# Patient Record
Sex: FEMALE | Race: WHITE | NOT HISPANIC OR LATINO | Employment: FULL TIME | URBAN - METROPOLITAN AREA
[De-identification: names, ages, dates, MRNs, and addresses within clinical notes are randomized per-mention and may not be internally consistent; named-entity substitution may affect disease eponyms.]

---

## 2017-08-30 ENCOUNTER — HOSPITAL ENCOUNTER (OUTPATIENT)
Facility: MEDICAL CENTER | Age: 32
End: 2017-08-31
Attending: EMERGENCY MEDICINE | Admitting: HOSPITALIST
Payer: COMMERCIAL

## 2017-08-30 ENCOUNTER — RESOLUTE PROFESSIONAL BILLING HOSPITAL PROF FEE (OUTPATIENT)
Dept: HOSPITALIST | Facility: MEDICAL CENTER | Age: 32
End: 2017-08-30
Payer: COMMERCIAL

## 2017-08-30 PROBLEM — R82.71 ASYMPTOMATIC BACTERIURIA: Status: ACTIVE | Noted: 2017-08-30

## 2017-08-30 PROBLEM — T78.3XXA ANGIOEDEMA: Status: ACTIVE | Noted: 2017-08-30

## 2017-08-30 PROBLEM — I73.00 RAYNAUD PHENOMENON: Status: ACTIVE | Noted: 2017-08-30

## 2017-08-30 LAB
ALBUMIN SERPL BCP-MCNC: 4.8 G/DL (ref 3.2–4.9)
ALBUMIN/GLOB SERPL: 1.6 G/DL
ALP SERPL-CCNC: 50 U/L (ref 30–99)
ALT SERPL-CCNC: 6 U/L (ref 2–50)
ANION GAP SERPL CALC-SCNC: 10 MMOL/L (ref 0–11.9)
APPEARANCE UR: CLEAR
AST SERPL-CCNC: 15 U/L (ref 12–45)
BACTERIA #/AREA URNS HPF: ABNORMAL /HPF
BASOPHILS # BLD AUTO: 0.4 % (ref 0–1.8)
BASOPHILS # BLD: 0.04 K/UL (ref 0–0.12)
BILIRUB SERPL-MCNC: 0.8 MG/DL (ref 0.1–1.5)
BILIRUB UR QL STRIP.AUTO: NEGATIVE
BUN SERPL-MCNC: 10 MG/DL (ref 8–22)
C4 SERPL-MCNC: 30 MG/DL (ref 19–52)
CALCIUM SERPL-MCNC: 10 MG/DL (ref 8.5–10.5)
CHLORIDE SERPL-SCNC: 107 MMOL/L (ref 96–112)
CO2 SERPL-SCNC: 21 MMOL/L (ref 20–33)
COLOR UR: YELLOW
CREAT SERPL-MCNC: 0.71 MG/DL (ref 0.5–1.4)
CRP SERPL HS-MCNC: 0.18 MG/DL (ref 0–0.75)
CULTURE IF INDICATED INDCX: YES UA CULTURE
EOSINOPHIL # BLD AUTO: 0.05 K/UL (ref 0–0.51)
EOSINOPHIL NFR BLD: 0.6 % (ref 0–6.9)
EPI CELLS #/AREA URNS HPF: ABNORMAL /HPF
ERYTHROCYTE [DISTWIDTH] IN BLOOD BY AUTOMATED COUNT: 41.8 FL (ref 35.9–50)
ERYTHROCYTE [SEDIMENTATION RATE] IN BLOOD BY WESTERGREN METHOD: 20 MM/HOUR (ref 0–20)
GFR SERPL CREATININE-BSD FRML MDRD: >60 ML/MIN/1.73 M 2
GLOBULIN SER CALC-MCNC: 3 G/DL (ref 1.9–3.5)
GLUCOSE SERPL-MCNC: 84 MG/DL (ref 65–99)
GLUCOSE UR STRIP.AUTO-MCNC: NEGATIVE MG/DL
HCG UR QL: NEGATIVE
HCT VFR BLD AUTO: 40.5 % (ref 37–47)
HGB BLD-MCNC: 13.3 G/DL (ref 12–16)
HYALINE CASTS #/AREA URNS LPF: ABNORMAL /LPF
IMM GRANULOCYTES # BLD AUTO: 0.03 K/UL (ref 0–0.11)
IMM GRANULOCYTES NFR BLD AUTO: 0.3 % (ref 0–0.9)
KETONES UR STRIP.AUTO-MCNC: 80 MG/DL
LEUKOCYTE ESTERASE UR QL STRIP.AUTO: ABNORMAL
LYMPHOCYTES # BLD AUTO: 1.98 K/UL (ref 1–4.8)
LYMPHOCYTES NFR BLD: 21.9 % (ref 22–41)
MCH RBC QN AUTO: 30 PG (ref 27–33)
MCHC RBC AUTO-ENTMCNC: 32.8 G/DL (ref 33.6–35)
MCV RBC AUTO: 91.2 FL (ref 81.4–97.8)
MICRO URNS: ABNORMAL
MONOCYTES # BLD AUTO: 0.41 K/UL (ref 0–0.85)
MONOCYTES NFR BLD AUTO: 4.5 % (ref 0–13.4)
NEUTROPHILS # BLD AUTO: 6.55 K/UL (ref 2–7.15)
NEUTROPHILS NFR BLD: 72.3 % (ref 44–72)
NITRITE UR QL STRIP.AUTO: POSITIVE
NRBC # BLD AUTO: 0 K/UL
NRBC BLD AUTO-RTO: 0 /100 WBC
PH UR STRIP.AUTO: 5.5 [PH]
PLATELET # BLD AUTO: 265 K/UL (ref 164–446)
PMV BLD AUTO: 11.4 FL (ref 9–12.9)
POTASSIUM SERPL-SCNC: 3.8 MMOL/L (ref 3.6–5.5)
PROT SERPL-MCNC: 7.8 G/DL (ref 6–8.2)
PROT UR QL STRIP: NEGATIVE MG/DL
RBC # BLD AUTO: 4.44 M/UL (ref 4.2–5.4)
RBC # URNS HPF: ABNORMAL /HPF
RBC UR QL AUTO: NEGATIVE
SODIUM SERPL-SCNC: 138 MMOL/L (ref 135–145)
SP GR UR REFRACTOMETRY: 1.01
SP GR UR STRIP.AUTO: 1.01
UROBILINOGEN UR STRIP.AUTO-MCNC: 0.2 MG/DL
WBC # BLD AUTO: 9.1 K/UL (ref 4.8–10.8)
WBC #/AREA URNS HPF: ABNORMAL /HPF

## 2017-08-30 PROCEDURE — 87077 CULTURE AEROBIC IDENTIFY: CPT

## 2017-08-30 PROCEDURE — 86140 C-REACTIVE PROTEIN: CPT

## 2017-08-30 PROCEDURE — 99220 PR INITIAL OBSERVATION CARE,LEVL III: CPT | Performed by: HOSPITALIST

## 2017-08-30 PROCEDURE — 81025 URINE PREGNANCY TEST: CPT

## 2017-08-30 PROCEDURE — 36415 COLL VENOUS BLD VENIPUNCTURE: CPT

## 2017-08-30 PROCEDURE — 700105 HCHG RX REV CODE 258: Performed by: EMERGENCY MEDICINE

## 2017-08-30 PROCEDURE — 94760 N-INVAS EAR/PLS OXIMETRY 1: CPT

## 2017-08-30 PROCEDURE — 80053 COMPREHEN METABOLIC PANEL: CPT

## 2017-08-30 PROCEDURE — 96374 THER/PROPH/DIAG INJ IV PUSH: CPT

## 2017-08-30 PROCEDURE — 85652 RBC SED RATE AUTOMATED: CPT

## 2017-08-30 PROCEDURE — 86160 COMPLEMENT ANTIGEN: CPT | Mod: 91

## 2017-08-30 PROCEDURE — 96375 TX/PRO/DX INJ NEW DRUG ADDON: CPT

## 2017-08-30 PROCEDURE — 81001 URINALYSIS AUTO W/SCOPE: CPT

## 2017-08-30 PROCEDURE — 85025 COMPLETE CBC W/AUTO DIFF WBC: CPT

## 2017-08-30 PROCEDURE — 700111 HCHG RX REV CODE 636 W/ 250 OVERRIDE (IP): Performed by: EMERGENCY MEDICINE

## 2017-08-30 PROCEDURE — 86160 COMPLEMENT ANTIGEN: CPT

## 2017-08-30 PROCEDURE — 96361 HYDRATE IV INFUSION ADD-ON: CPT

## 2017-08-30 PROCEDURE — 87086 URINE CULTURE/COLONY COUNT: CPT

## 2017-08-30 PROCEDURE — 87186 SC STD MICRODIL/AGAR DIL: CPT

## 2017-08-30 PROCEDURE — 99285 EMERGENCY DEPT VISIT HI MDM: CPT

## 2017-08-30 PROCEDURE — G0378 HOSPITAL OBSERVATION PER HR: HCPCS

## 2017-08-30 RX ORDER — ERGOCALCIFEROL 1.25 MG/1
50000 CAPSULE ORAL
Status: SHIPPED | COMMUNITY
End: 2021-11-02

## 2017-08-30 RX ORDER — POLYETHYLENE GLYCOL 3350 17 G/17G
1 POWDER, FOR SOLUTION ORAL
Status: DISCONTINUED | OUTPATIENT
Start: 2017-08-30 | End: 2017-08-31 | Stop reason: HOSPADM

## 2017-08-30 RX ORDER — METHYLPREDNISOLONE SODIUM SUCCINATE 125 MG/2ML
125 INJECTION, POWDER, LYOPHILIZED, FOR SOLUTION INTRAMUSCULAR; INTRAVENOUS ONCE
Status: COMPLETED | OUTPATIENT
Start: 2017-08-30 | End: 2017-08-30

## 2017-08-30 RX ORDER — AMOXICILLIN 250 MG
2 CAPSULE ORAL 2 TIMES DAILY
Status: DISCONTINUED | OUTPATIENT
Start: 2017-08-30 | End: 2017-08-31 | Stop reason: HOSPADM

## 2017-08-30 RX ORDER — DIPHENHYDRAMINE HYDROCHLORIDE 50 MG/ML
25 INJECTION INTRAMUSCULAR; INTRAVENOUS EVERY 6 HOURS PRN
Status: DISCONTINUED | OUTPATIENT
Start: 2017-08-30 | End: 2017-08-31

## 2017-08-30 RX ORDER — SODIUM CHLORIDE 9 MG/ML
1000 INJECTION, SOLUTION INTRAVENOUS ONCE
Status: COMPLETED | OUTPATIENT
Start: 2017-08-30 | End: 2017-08-30

## 2017-08-30 RX ORDER — DIPHENHYDRAMINE HYDROCHLORIDE 50 MG/ML
25 INJECTION INTRAMUSCULAR; INTRAVENOUS ONCE
Status: COMPLETED | OUTPATIENT
Start: 2017-08-30 | End: 2017-08-30

## 2017-08-30 RX ORDER — BISACODYL 10 MG
10 SUPPOSITORY, RECTAL RECTAL
Status: DISCONTINUED | OUTPATIENT
Start: 2017-08-30 | End: 2017-08-31 | Stop reason: HOSPADM

## 2017-08-30 RX ADMIN — DIPHENHYDRAMINE HYDROCHLORIDE 25 MG: 50 INJECTION, SOLUTION INTRAMUSCULAR; INTRAVENOUS at 17:34

## 2017-08-30 RX ADMIN — SODIUM CHLORIDE 1000 ML: 9 INJECTION, SOLUTION INTRAVENOUS at 20:20

## 2017-08-30 RX ADMIN — METHYLPREDNISOLONE SODIUM SUCCINATE 125 MG: 125 INJECTION, POWDER, FOR SOLUTION INTRAMUSCULAR; INTRAVENOUS at 17:34

## 2017-08-30 ASSESSMENT — LIFESTYLE VARIABLES
EVER_SMOKED: NEVER
ALCOHOL_USE: NO

## 2017-08-30 ASSESSMENT — ENCOUNTER SYMPTOMS
BLURRED VISION: 0
SENSORY CHANGE: 0
CHILLS: 0
NAUSEA: 0
WEAKNESS: 0
BRUISES/BLEEDS EASILY: 0
TINGLING: 0
HEADACHES: 0
VOMITING: 0
PALPITATIONS: 0
ABDOMINAL PAIN: 0
FEVER: 0
SHORTNESS OF BREATH: 0

## 2017-08-30 ASSESSMENT — PAIN SCALES - GENERAL: PAINLEVEL_OUTOF10: 4

## 2017-08-30 ASSESSMENT — PATIENT HEALTH QUESTIONNAIRE - PHQ9
SUM OF ALL RESPONSES TO PHQ QUESTIONS 1-9: 0
SUM OF ALL RESPONSES TO PHQ9 QUESTIONS 1 AND 2: 0
1. LITTLE INTEREST OR PLEASURE IN DOING THINGS: NOT AT ALL
2. FEELING DOWN, DEPRESSED, IRRITABLE, OR HOPELESS: NOT AT ALL

## 2017-08-30 NOTE — ED NOTES
".  Chief Complaint   Patient presents with   • Tongue Swelling     x 6 days, \"gets better\" throughout the day and then \"in the mornings and evenings swells up again\", denies difficulty breathing, NAD, taking benadryl and allergra with      ./60   Pulse (!) 103   Temp 36.9 °C (98.4 °F) (Temporal)   Resp 16   Ht 1.727 m (5' 8\")   Wt 71.1 kg (156 lb 12 oz)   SpO2 97%   BMI 23.83 kg/m²     Ambulatory to triage with above complaint, educated on triage process, placed in lobby, told to inform staff of any changes in condtion.    "

## 2017-08-30 NOTE — ED NOTES
Blood samples collect in lobby. No new swelling. Managing secretions. No Wheezing or respiratory distress at this time.

## 2017-08-30 NOTE — ED PROVIDER NOTES
"ED Provider Note    CHIEF COMPLAINT  Chief Complaint   Patient presents with   • Tongue Swelling     x 6 days, \"gets better\" throughout the day and then \"in the mornings and evenings swells up again\", denies difficulty breathing, NAD, taking benadryl and allergra with        HPI  Karina Stern is a 32 y.o. female who presents with complaints of swelling to her tongue for the past 6 days. The patient says that the swelling seems worse in the morning, then gets better throughout the day, then in the evening seems to get worse again. The swelling appears be worse on the left side of the tongue. She has had no swelling to the throat, face, or lips, or any other parts of her body. She has had no rash. She denies any difficulty breathing or swallowing. She has not been ill with any recent fever, chills, sore throat, cough, congestion, vomiting, or diarrhea. She has been taking Benadryl and Allegra since the symptoms started. The patient does not take any medications on a daily basis. She has not eaten any new foods, nuts, or fruits. She otherwise has no known allergies. There is no history in the family of angioedema.    REVIEW OF SYSTEMS  See HPI for further details. All other systems are negative.     PAST MEDICAL HISTORY  No past medical history on file.    FAMILY HISTORY  No family history on file.    SOCIAL HISTORY  Social History     Social History   • Marital status: Single     Spouse name: N/A   • Number of children: N/A   • Years of education: N/A     Social History Main Topics   • Smoking status: Never Smoker   • Smokeless tobacco: Never Used   • Alcohol use No   • Drug use: No   • Sexual activity: Not on file     Other Topics Concern   • Not on file     Social History Narrative   • No narrative on file       SURGICAL HISTORY  Past Surgical History:   Procedure Laterality Date   • CHOLECYSTECTOMY     • ERCP     • PRIMARY C SECTION         CURRENT MEDICATIONS  Home Medications     Reviewed by Candi KATHLEEN" "EUGENIO Duarte (Registered Nurse) on 08/30/17 at 1502  Med List Status: Partial   Medication Last Dose Status   benzonatate (TESSALON) 100 MG Cap  Active   fluticasone (FLONASE) 50 MCG/ACT nasal spray  Active   hydrocod polst-chlorphen polst (TUSSIONEX) 10-8 MG/5ML Liquid CR  Active   maalox plus-benadryl-xylocaine (MBX)  Active   Tetrahydrozoline-Zn Sulfate (EYE DROPS ALLERGY RELIEF OP) Not Taking Active                ALLERGIES  Allergies   Allergen Reactions   • Dilaudid [Hydromorphone Hcl]        PHYSICAL EXAM  VITAL SIGNS: /60   Pulse (!) 103   Temp 36.9 °C (98.4 °F) (Temporal)   Resp 16   Ht 1.727 m (5' 8\")   Wt 71.1 kg (156 lb 12 oz)   SpO2 97%   BMI 23.83 kg/m²   Constitutional: Awake, alert, in no acute distress, Non-toxic appearance.   HENT: Atraumatic. Bilateral external ears normal, mucous membranes moist, throat nonerythematous without exudates, nose is normal.There is no swelling to the lips, soft palate or uvula. The left side of the tongue is moderately swollen, the right side slightly swollen area there are 2 bite marks noted to the left underside of the tongue with slight amount of dark blood staining the tongue.  Eyes: PERRL, EOMI, conjunctiva moist, noninjected.  Neck: Nontender, Normal range of motion, No nuchal rigidity, No stridor.   Lymphatic: No lymphadenopathy noted.   Cardiovascular: Regular rate and rhythm, no murmurs, rubs, gallops.  Thorax & Lungs:  Good breath sounds bilaterally, no wheezes, rales, or retractions.  No chest tenderness.  Abdomen: Bowel sounds normal, Soft, nontender, nondistended, no rebound, guarding, masses.  Back: No CVA or spinal tenderness.  Extremities: Intact distal pulses, No edema, No tenderness.   Skin: Warm, Dry, No rashes.   Musculoskeletal: No joint swelling or tenderness.  Neurologic: Alert & oriented x 3, sensory and motor function normal. No focal deficits.   Psychiatric: Affect normal, Judgment normal, Mood normal. "       RADIOLOGY/PROCEDURES  No orders to display         COURSE & MEDICAL DECISION MAKING  Pertinent Labs & Imaging studies reviewed. (See chart for details)  The patient presents with what appears to be etiopathic angioedema. She has had swelling to the tongue, but no other associated symptoms such as facial swelling or urticaria. Labwork was obtained and shows a normal CBC, normal chemistry panel, hCG negative, C-reactive protein 0.18, sedimentation rate 20, C4 complement 30, urine 80 ketones, positive nitrites, small leukocyte esterase, many bacteria.  Patient was given a dose of Solu-Medrol 125 mg IV, and Benadryl 25 mg IV. She will be given a bolus of normal saline as she does show ketones in her urine. Case was discussed with Dr. Camacho for admission.    FINAL IMPRESSION  1. Angioedema  2.   3.         Electronically signed by: Grey Eddy, 8/30/2017 3:27 PM

## 2017-08-30 NOTE — ED NOTES
"Chief Complaint   Patient presents with   • Tongue Swelling     x 6 days, \"gets better\" throughout the day and then \"in the mornings and evenings swells up again\", denies difficulty breathing, NAD, taking benadryl and allergra with      Agree with triage RN. Chart up for ERP.   "

## 2017-08-31 VITALS
RESPIRATION RATE: 16 BRPM | HEART RATE: 81 BPM | HEIGHT: 67 IN | BODY MASS INDEX: 23.91 KG/M2 | DIASTOLIC BLOOD PRESSURE: 57 MMHG | OXYGEN SATURATION: 97 % | SYSTOLIC BLOOD PRESSURE: 107 MMHG | TEMPERATURE: 98.3 F | WEIGHT: 152.34 LBS

## 2017-08-31 PROBLEM — R82.71 ASYMPTOMATIC BACTERIURIA: Status: RESOLVED | Noted: 2017-08-30 | Resolved: 2017-08-31

## 2017-08-31 PROCEDURE — G0378 HOSPITAL OBSERVATION PER HR: HCPCS

## 2017-08-31 PROCEDURE — A9270 NON-COVERED ITEM OR SERVICE: HCPCS | Performed by: NURSE PRACTITIONER

## 2017-08-31 PROCEDURE — 700111 HCHG RX REV CODE 636 W/ 250 OVERRIDE (IP): Performed by: HOSPITALIST

## 2017-08-31 PROCEDURE — 700111 HCHG RX REV CODE 636 W/ 250 OVERRIDE (IP): Performed by: NURSE PRACTITIONER

## 2017-08-31 PROCEDURE — 90471 IMMUNIZATION ADMIN: CPT

## 2017-08-31 PROCEDURE — 99217 PR OBSERVATION CARE DISCHARGE: CPT | Performed by: HOSPITALIST

## 2017-08-31 PROCEDURE — 700102 HCHG RX REV CODE 250 W/ 637 OVERRIDE(OP): Performed by: NURSE PRACTITIONER

## 2017-08-31 PROCEDURE — 90686 IIV4 VACC NO PRSV 0.5 ML IM: CPT | Performed by: HOSPITALIST

## 2017-08-31 PROCEDURE — 96376 TX/PRO/DX INJ SAME DRUG ADON: CPT

## 2017-08-31 RX ORDER — DIPHENHYDRAMINE HYDROCHLORIDE 50 MG/ML
25 INJECTION INTRAMUSCULAR; INTRAVENOUS EVERY 6 HOURS
Status: DISCONTINUED | OUTPATIENT
Start: 2017-08-31 | End: 2017-08-31 | Stop reason: HOSPADM

## 2017-08-31 RX ORDER — CIPROFLOXACIN 250 MG/1
250 TABLET, FILM COATED ORAL 2 TIMES DAILY
Qty: 6 TAB | Refills: 0 | Status: SHIPPED | OUTPATIENT
Start: 2017-08-31 | End: 2017-09-07

## 2017-08-31 RX ORDER — PREDNISONE 20 MG/1
20 TABLET ORAL DAILY
Qty: 4 TAB | Refills: 0 | Status: SHIPPED | OUTPATIENT
Start: 2017-09-01 | End: 2017-09-05

## 2017-08-31 RX ORDER — PREDNISONE 20 MG/1
40 TABLET ORAL DAILY
Status: DISCONTINUED | OUTPATIENT
Start: 2017-08-31 | End: 2017-08-31 | Stop reason: HOSPADM

## 2017-08-31 RX ORDER — FAMOTIDINE 20 MG/1
20 TABLET, FILM COATED ORAL 2 TIMES DAILY
Qty: 10 TAB | Refills: 0 | Status: SHIPPED | OUTPATIENT
Start: 2017-08-31 | End: 2017-09-05

## 2017-08-31 RX ORDER — DIPHENHYDRAMINE HCL 25 MG
25 CAPSULE ORAL EVERY 6 HOURS PRN
Qty: 30 CAP | COMMUNITY
Start: 2017-08-31 | End: 2017-12-20

## 2017-08-31 RX ORDER — FAMOTIDINE 20 MG/1
20 TABLET, FILM COATED ORAL 2 TIMES DAILY
Status: DISCONTINUED | OUTPATIENT
Start: 2017-08-31 | End: 2017-08-31 | Stop reason: HOSPADM

## 2017-08-31 RX ADMIN — PREDNISONE 40 MG: 20 TABLET ORAL at 09:04

## 2017-08-31 RX ADMIN — DIPHENHYDRAMINE HYDROCHLORIDE 25 MG: 50 INJECTION, SOLUTION INTRAMUSCULAR; INTRAVENOUS at 03:32

## 2017-08-31 RX ADMIN — INFLUENZA A VIRUS A/MICHIGAN/45/2015 X-275 (H1N1) ANTIGEN (FORMALDEHYDE INACTIVATED), INFLUENZA A VIRUS A/HONG KONG/4801/2014 X-263B (H3N2) ANTIGEN (FORMALDEHYDE INACTIVATED), INFLUENZA B VIRUS B/PHUKET/3073/2013 ANTIGEN (FORMALDEHYDE INACTIVATED), AND INFLUENZA B VIRUS B/BRISBANE/60/2008 ANTIGEN (FORMALDEHYDE INACTIVATED) 0.5 ML: 15; 15; 15; 15 INJECTION, SUSPENSION INTRAMUSCULAR at 09:04

## 2017-08-31 RX ADMIN — DIPHENHYDRAMINE HYDROCHLORIDE 25 MG: 50 INJECTION, SOLUTION INTRAMUSCULAR; INTRAVENOUS at 12:06

## 2017-08-31 RX ADMIN — FAMOTIDINE 20 MG: 20 TABLET, FILM COATED ORAL at 12:05

## 2017-08-31 ASSESSMENT — PAIN SCALES - GENERAL: PAINLEVEL_OUTOF10: 0

## 2017-08-31 NOTE — ASSESSMENT & PLAN NOTE
Cyclic episodes of angioedema of primarily her left tongue x6 days. Lower likelihood of hereditary angioedema or acquired C1 inhibitor deficiency  2/2 normal C4 levels. CRP normal. No clear exposure to allergen. Takes ibuprofen PRN tongue pain but timeline does not match for it being the etiology of her tongue swelling.  - s/p solumedrol 125mg IV x1 and benadryl 25mg IV x1 in ED  - benadryl 25mg IV PRN itching, swelling  - bedside nursing swallow evaluation, if passes then can have full liquids  - may need outpatient allergist

## 2017-08-31 NOTE — ASSESSMENT & PLAN NOTE
Patient with urinalysis from outside facility that is positive for nitrate and leuk esterase but she denies any dysuria, increased frequency, or flank pain.  - monitor clinically  - no treatment since she is asymptomatic

## 2017-08-31 NOTE — PROGRESS NOTES
Admitted to T209 from ER via gurney. AAO x4, no complaints at this time. Bed in low position, call light w/in reach. Bedside report to AUTUMN Hayes.

## 2017-08-31 NOTE — DISCHARGE INSTRUCTIONS
Activity: As tolerated.   Diet: regular   Followup:   -PCP 1 week   Instructions:   -Return to the ER or call 911 IMMEDIATELY for any increased tongue swelling, SOB, difficulty swallowing, drooling.   -Given instructions to return to the ER if any new or worsening symptoms, worsening condition, or failure to improve   -Call PCP for followup   -No smoking, no alcohol, no caffeine   -Encourage risk factor reduction with tobacco and alcohol abstinence, diet changes, weight loss, and exercise.    Discharge Instructions    Discharged to home by car with relative. Discharged via wheelchair, hospital escort: Yes.  Special equipment needed: Not Applicable    Be sure to schedule a follow-up appointment with your primary care doctor or any specialists as instructed.     Discharge Plan:   Diet Plan: Discussed  Activity Level: Discussed  Confirmed Follow up Appointment: Patient to Call and Schedule Appointment  Confirmed Symptoms Management: Discussed  Medication Reconciliation Updated: Yes  Influenza Vaccine Indication: Indicated: 9 to 64 years of age  Influenza Vaccine Given - only chart on this line when given: Influenza Vaccine Given (See MAR)    I understand that a diet low in cholesterol, fat, and sodium is recommended for good health. Unless I have been given specific instructions below for another diet, I accept this instruction as my diet prescription.   Other diet: Heart healthy     Special Instructions: None    · Is patient discharged on Warfarin / Coumadin?   No     · Is patient Post Blood Transfusion?  No    Depression / Suicide Risk    As you are discharged from this [facility] facility, it is important to learn how to keep safe from harming yourself.    Recognize the warning signs:  · Abrupt changes in personality, positive or negative- including increase in energy   · Giving away possessions  · Change in eating patterns- significant weight changes-  positive or negative  · Change in sleeping patterns- unable  to sleep or sleeping all the time   · Unwillingness or inability to communicate  · Depression  · Unusual sadness, discouragement and loneliness  · Talk of wanting to die  · Neglect of personal appearance   · Rebelliousness- reckless behavior  · Withdrawal from people/activities they love  · Confusion- inability to concentrate     If you or a loved one observes any of these behaviors or has concerns about self-harm, here's what you can do:  · Talk about it- your feelings and reasons for harming yourself  · Remove any means that you might use to hurt yourself (examples: pills, rope, extension cords, firearm)  · Get professional help from the community (Mental Health, Substance Abuse, psychological counseling)  · Do not be alone:Call your Safe Contact- someone whom you trust who will be there for you.  · Call your local CRISIS HOTLINE 750-5743 or 844-220-5577  · Call your local Children's Mobile Crisis Response Team [place] (640) 941-4342 or www.[employer]  · Call the toll free National Suicide Prevention Hotlines   · National Suicide Prevention Lifeline 462-902-PGGE (1447)  · National Hope Line Network 800-SUICIDE (866-7256)

## 2017-08-31 NOTE — DISCHARGE SUMMARY
Hospital Medicine Discharge Note     Patient ID:  Karina Stern  [id number]  32 y.o.female  1985    Admit Date:  8/30/2017       Discharge Date:   8/31/2017    Primary Care Provider: Lakshmi Landaverde M.D.    Admitting Physician: Mee Solano M.D.  Discharging Physician: Loren Alcantar MD    Chief Complaint: swollen tongue    Discharge Diagnoses:     Angioedema    Asymptomatic bacteriuria    Chronic Medical Problems:    Raynaud phenomenon    Code Status: Full Code    Hospital Summary:       Please refer to H&P by Dr Solano on 8/30/2017 for full details.      In brief, Karina Stern is a 32 y.o. female who was admitted 8/30/2017 for swelling of her tongue that is intermittent for 6 days. She tried taking allegra and benadryl without relief. She developed difficulty swallowing. The patient denies new diet, foods, or medications. She was placed under observation status.    The patient was given IV steroids, benadryl, pepcid, and symptomatic support. Her swallowing was evaluated by nursing and felt to be safe. She had improvement of the tongue swelling with steroids. Her swallowing has improved, and she is tolerating PO intake and able to hydrate herself. She denies drooling, SOB, or difficulty swallowing. She will be continued on a short course of PO steroids, pepcid, and antihistamines to reduce inflammation. She should follow with an allergist if this continues. She has been given strict instructions to return to the ER or call 911 for any increased tongue swelling, SOB, difficulty swallowing, drooling.     Therefore, she is discharged in good and stable condition with close outpatient follow-up.    Consultants:      None    Studies:    Laboratory:   Recent Labs      08/30/17   1443   WBC  9.1   RBC  4.44   HEMOGLOBIN  13.3   HEMATOCRIT  40.5   MCV  91.2   MCH  30.0   MCHC  32.8*   RDW  41.8   PLATELETCT  265   MPV  11.4       Recent Labs      08/30/17   1443   SODIUM  138   POTASSIUM  3.8    CHLORIDE  107   CO2  21   GLUCOSE  84   BUN  10   CREATININE  0.71   CALCIUM  10.0       Recent Labs      08/30/17   1443   ALTSGPT  6   ASTSGOT  15   ALKPHOSPHAT  50   TBILIRUBIN  0.8   GLUCOSE  84      Results     Procedure Component Value Units Date/Time    URINE CULTURE(NEW) [[id number]] Collected:  08/30/17 1553    Order Status:  Completed Updated:  08/30/17 1644    URINALYSIS,CULTURE IF INDICATED [[id number]]  (Abnormal) Collected:  08/30/17 1553    Order Status:  Completed Specimen:  Urine Updated:  08/30/17 1634     Color Yellow     Character Clear     Specific Gravity 1.014     Ph 5.5     Glucose Negative mg/dL      Ketones 80 (A) mg/dL      Protein Negative mg/dL      Bilirubin Negative     Urobilinogen, Urine 0.2     Nitrite Positive (A)     Leukocyte Esterase Small (A)     Occult Blood Negative     Micro Urine Req Microscopic     Culture Indicated Yes UA Culture         ESR 20  C4 30  CRP 0.18    Procedures/Surgeries:        None    Disposition:  Discharge home    Condition:  Stable    Instructions:   Activity: As tolerated.  Diet: regular  Followup:   -PCP 1 week  Instructions:  -Return to the ER or call 911 IMMEDIATELY for any increased tongue swelling, SOB, difficulty swallowing, drooling.   -Given instructions to return to the ER if any new or worsening symptoms, worsening condition, or failure to improve  -Call PCP for followup  -No smoking, no alcohol, no caffeine  -Encourage risk factor reduction with tobacco and alcohol abstinence, diet changes, weight loss, and exercise.     Discharge Medications:           Medication List      START taking these medications      Instructions   famotidine 20 MG Tabs  Commonly known as:  PEPCID   Take 1 Tab by mouth 2 Times a Day for 5 days.  Dose:  20 mg     predniSONE 20 MG Tabs  Start taking on:  9/1/2017  Commonly known as:  DELTASONE   Take 1 Tab by mouth every day for 4 days.  Dose:  20 mg        CONTINUE taking these medications      Instructions    diphenhydrAMINE 25 MG capsule  Commonly known as:  BENADRYL   Take 1 Cap by mouth every 6 hours as needed for Itching.  Dose:  25 mg     vitamin D (Ergocalciferol) 27972 units Caps capsule  Commonly known as:  DRISDOL   Take 50,000 Units by mouth every 14 days.  Dose:  97730 Units              RX sent to [facility] [address] [place] [address], [place] - [address] [place] [address]    Please CC the above physicians    TODD Elliott  8/31/2017  10:40 AM

## 2017-08-31 NOTE — H&P
" Hospital Medicine History and Physical    Date of Service  8/30/2017    Chief Complaint  Chief Complaint   Patient presents with   • Tongue Swelling     x 6 days, \"gets better\" throughout the day and then \"in the mornings and evenings swells up again\", denies difficulty breathing, NAD, taking benadryl and allergra with        History of Presenting Illness  32 y.o. female who presented 8/30/2017 with angioedema of the tongue x6 days. She was recently seen by a rheumatologist and diagnosed with Raynaud's phenomenon, lupus studies were negative (per patient). Angioedema not exacerbated by any noticeable factors, occurs in the mornings and evenings. She has been taking benadryl and allegra to help reduce the swelling but is unsure if it helps or it just waxes and wanes on its own. She denies any new diets, unusual foods over the last month.    She decided to get evaluated today because she was starting to have difficulty eating and could only drink through a straw. Denies SOB, difficulty or pain swallowing. She only has pain in her tongue when it expands and turns blue.      Primary Care Physician  Lakshmi Landaverde M.D.    Code Status  Full Code    Review of Systems  Review of Systems   Constitutional: Negative for chills and fever.   Eyes: Negative for blurred vision.   Respiratory: Negative for shortness of breath.    Cardiovascular: Negative for chest pain, palpitations and leg swelling.   Gastrointestinal: Negative for abdominal pain, nausea and vomiting.   Genitourinary: Negative for dysuria.   Neurological: Negative for tingling, sensory change, weakness and headaches.   Endo/Heme/Allergies: Does not bruise/bleed easily.        Past Medical History  No past medical history on file.    Surgical History  Past Surgical History:   Procedure Laterality Date   • CHOLECYSTECTOMY     • ERCP     • PRIMARY C SECTION         Medications    No current outpatient prescriptions on file prior to encounter. "   Allegra  Benadryl  Ibuprofen PRN  Family History  No family history on file.  Hypothyroidism and hyperthyroidism (mother and grandmother, unknown which had which).    Social History  Social History   Substance Use Topics   • Smoking status: Never Smoker   • Smokeless tobacco: Never Used   • Alcohol use No   Lives with  and 7yo child. Employed.    Allergies  Allergies   Allergen Reactions   • Dilaudid [Hydromorphone Hcl]    respiratory depression     Physical Exam  Laboratory   Hemodynamics  Temp (24hrs), Av.9 °C (98.5 °F), Min:36.9 °C (98.4 °F), Max:36.9 °C (98.5 °F)   Temperature: 36.9 °C (98.5 °F)  Pulse  Av.5  Min: 74  Max: 103    Blood Pressure: 115/58, NIBP: 126/58      Respiratory      Respiration: 16, Pulse Oximetry: 99 %             Physical Exam   Constitutional: She is oriented to person, place, and time. She appears well-developed and well-nourished. No distress.   HENT:   Head: Normocephalic.   Mouth/Throat: Oropharynx is clear and moist.   Significant tongue swelling L side >> R side, inferior left tongue lac and scant amount of blood present; sensation on tongue normal on both sides   Eyes: Conjunctivae and EOM are normal. Pupils are equal, round, and reactive to light.   Neck: Neck supple.   Cardiovascular: Normal rate and regular rhythm.    Pulmonary/Chest: Breath sounds normal. No respiratory distress.   Abdominal: Soft. Bowel sounds are normal. She exhibits no distension. There is no tenderness.   Lymphadenopathy:     She has no cervical adenopathy.   Neurological: She is alert and oriented to person, place, and time.   Skin: Skin is warm and dry.   Nursing note and vitals reviewed.      Recent Labs      17   1443   WBC  9.1   RBC  4.44   HEMOGLOBIN  13.3   HEMATOCRIT  40.5   MCV  91.2   MCH  30.0   MCHC  32.8*   RDW  41.8   PLATELETCT  265   MPV  11.4     Recent Labs      17   1443   SODIUM  138   POTASSIUM  3.8   CHLORIDE  107   CO2  21   GLUCOSE  84   BUN  10    CREATININE  0.71   CALCIUM  10.0     Recent Labs      08/30/17   1443   ALTSGPT  6   ASTSGOT  15   ALKPHOSPHAT  50   TBILIRUBIN  0.8   GLUCOSE  84                 No results found for: TROPONINI  Urinalysis:    Lab Results  Component Value Date/Time   SPECGRAVITY 1.014 08/30/2017 1553   SPECGRAVITY 1.014 08/30/2017 1553   GLUCOSEUR Negative 08/30/2017 1553   KETONES 80 (A) 08/30/2017 1553   NITRITE Positive (A) 08/30/2017 1553   WBCURINE 10-20 (A) 08/30/2017 1553   RBCURINE 0-2 08/30/2017 1553   BACTERIA Many (A) 08/30/2017 1553   EPITHELCELL Few 08/30/2017 1553         Assessment/Plan     I anticipate this patient is appropriate for observation status at this time.    Angioedema- (present on admission)   Assessment & Plan    Cyclic episodes of angioedema of primarily her left tongue x6 days. Lower likelihood of hereditary angioedema or acquired C1 inhibitor deficiency  2/2 normal C4 levels. CRP normal. No clear exposure to allergen. Takes ibuprofen PRN tongue pain but timeline does not match for it being the etiology of her tongue swelling.  - s/p solumedrol 125mg IV x1 and benadryl 25mg IV x1 in ED  - benadryl 25mg IV PRN itching, swelling  - bedside nursing swallow evaluation, if passes then can have full liquids  - may need outpatient allergist        Asymptomatic bacteriuria- (present on admission)   Assessment & Plan    Patient with urinalysis from outside facility that is positive for nitrate and leuk esterase but she denies any dysuria, increased frequency, or flank pain.  - monitor clinically  - no treatment since she is asymptomatic        Raynaud phenomenon- (present on admission)   Assessment & Plan    Recently diagnosed by Dr. Mills (rheumatology). Not currently symptomatic            VTE prophylaxis: SCDs until ambulating.

## 2017-08-31 NOTE — PROGRESS NOTES
Assumed patient care. Report received from AUTUMN Campbell.  Pt A&Ox4.  Respirations even, unlabored on room air.  Slight swelling to right side of tongue, moderate swelling to left side of tongue noted.  Pt denies SOB or difficulty breathing. Pt denies difficulty eating/swallowing. Pt denies any pain at this time.   Bed in locked, lowest position.  Call light and personal belongings within reach.  Pt updated on POC, updated communication board.  Needs met, will continue to monitor.

## 2017-08-31 NOTE — DISCHARGE SUMMARY
Hospital Medicine Discharge Note     Patient ID:  Karina Stern  [id number]  32 y.o.female  1985    Admit Date:  8/30/2017       Discharge Date:   8/31/2017    Primary Care Provider: Lakshmi Landaverde M.D.    Admitting Physician: Mee Solano M.D.  Discharging Physician: Loren Alcantar MD    Chief Complaint: swollen tongue    Discharge Diagnoses:     Angioedema    Acute cystitis    Chronic Medical Problems:    Raynaud phenomenon    Code Status: Full Code    Hospital Summary:       Please refer to H&P by Dr Solano on 8/30/2017 for full details.      In brief, Karina Stern is a 32 y.o. female who was admitted 8/30/2017 for swelling of her tongue that is intermittent for 6 days. She tried taking allegra and benadryl without relief. She developed difficulty swallowing. The patient denies new diet, foods, or medications. She was placed under observation status.    The patient was given IV steroids, benadryl, pepcid, and symptomatic support. Her swallowing was evaluated by nursing and felt to be safe. She had improvement of the tongue swelling with steroids. Her swallowing has improved, and she is tolerating PO intake and able to hydrate herself. She denies drooling, SOB, or difficulty swallowing. She will be continued on a short course of PO steroids, pepcid, and antihistamines to reduce inflammation. She should follow with an allergist if this continues. She has been given strict instructions to return to the ER or call 911 for any increased tongue swelling, SOB, difficulty swallowing, drooling.     Therefore, she is discharged in good and stable condition with close outpatient follow-up.    Consultants:      None    Studies:    Laboratory:   Recent Labs      08/30/17   1443   WBC  9.1   RBC  4.44   HEMOGLOBIN  13.3   HEMATOCRIT  40.5   MCV  91.2   MCH  30.0   MCHC  32.8*   RDW  41.8   PLATELETCT  265   MPV  11.4       Recent Labs      08/30/17   1443   SODIUM  138   POTASSIUM  3.8   CHLORIDE  107    CO2  21   GLUCOSE  84   BUN  10   CREATININE  0.71   CALCIUM  10.0       Recent Labs      08/30/17   1443   ALTSGPT  6   ASTSGOT  15   ALKPHOSPHAT  50   TBILIRUBIN  0.8   GLUCOSE  84      Results     Procedure Component Value Units Date/Time    URINE CULTURE(NEW) [[id number]]  (Abnormal) Collected:  08/30/17 1553    Order Status:  Completed Specimen:  Urine Updated:  08/31/17 1246     Significant Indicator POS (POS)     Source UR     Site --     Urine Culture -- (A)     Urine Culture -- (A)     Lactose fermenting Gram negative bethanie  >100,000 cfu/mL      URINALYSIS,CULTURE IF INDICATED [[id number]]  (Abnormal) Collected:  08/30/17 1553    Order Status:  Completed Specimen:  Urine Updated:  08/30/17 1634     Color Yellow     Character Clear     Specific Gravity 1.014     Ph 5.5     Glucose Negative mg/dL      Ketones 80 (A) mg/dL      Protein Negative mg/dL      Bilirubin Negative     Urobilinogen, Urine 0.2     Nitrite Positive (A)     Leukocyte Esterase Small (A)     Occult Blood Negative     Micro Urine Req Microscopic     Culture Indicated Yes UA Culture         ESR 20  C4 30  CRP 0.18    Procedures/Surgeries:        None    Disposition:  Discharge home    Condition:  Stable    Instructions:   Activity: As tolerated.  Diet: regular  Followup:   -PCP 1 week  Instructions:  -Return to the ER or call 911 IMMEDIATELY for any increased tongue swelling, SOB, difficulty swallowing, drooling.   -Given instructions to return to the ER if any new or worsening symptoms, worsening condition, or failure to improve  -Call PCP for followup  -No smoking, no alcohol, no caffeine  -Encourage risk factor reduction with tobacco and alcohol abstinence, diet changes, weight loss, and exercise.     Discharge Medications:           Medication List      START taking these medications      Instructions   ciprofloxacin 250 MG Tabs  Commonly known as:  CIPRO   Take 1 Tab by mouth 2 times a day.  Dose:  250 mg     famotidine 20 MG  Tabs  Commonly known as:  PEPCID   Take 1 Tab by mouth 2 Times a Day for 5 days.  Dose:  20 mg     predniSONE 20 MG Tabs  Start taking on:  9/1/2017  Commonly known as:  DELTASONE   Take 1 Tab by mouth every day for 4 days.  Dose:  20 mg        CONTINUE taking these medications      Instructions   diphenhydrAMINE 25 MG capsule  Commonly known as:  BENADRYL   Take 1 Cap by mouth every 6 hours as needed for Itching.  Dose:  25 mg     vitamin D (Ergocalciferol) 63717 units Caps capsule  Commonly known as:  DRISDOL   Take 50,000 Units by mouth every 14 days.  Dose:  64274 Units              RX sent to [facility] [address] [place] [address]    Please CC the above physicians    TODD Elliott  8/31/2017  10:40 AM

## 2017-08-31 NOTE — PROGRESS NOTES
A/o,assessment completed per CDU,poc discussed,verbalized understanding,tolerating diet,left tongue and bruising noted,not in any distress,call button within reach,will continue to monitor.

## 2017-08-31 NOTE — PROGRESS NOTES
IV dc'd.  Discharge instructions given to patient; patient verbalizes understanding, all questions answered.  Copy of DC summary provided, signed copy in chart.  3 prescriptions electronically prescribed to pt's pharmacy.  Pt states personal belongings are in possession.  Pt escorted off unit by this nurse without incident.

## 2017-09-01 LAB
BACTERIA UR CULT: ABNORMAL
BACTERIA UR CULT: ABNORMAL
C1INH SERPL-MCNC: 23 MG/DL (ref 21–39)
SIGNIFICANT IND 70042: ABNORMAL
SITE SITE: ABNORMAL
SOURCE SOURCE: ABNORMAL

## 2017-09-05 ENCOUNTER — PATIENT OUTREACH (OUTPATIENT)
Dept: HEALTH INFORMATION MANAGEMENT | Facility: OTHER | Age: 32
End: 2017-09-05

## 2017-09-07 ENCOUNTER — OFFICE VISIT (OUTPATIENT)
Dept: MEDICAL GROUP | Facility: CLINIC | Age: 32
End: 2017-09-07
Payer: COMMERCIAL

## 2017-09-07 VITALS
DIASTOLIC BLOOD PRESSURE: 60 MMHG | OXYGEN SATURATION: 100 % | SYSTOLIC BLOOD PRESSURE: 90 MMHG | TEMPERATURE: 98.6 F | BODY MASS INDEX: 23.04 KG/M2 | RESPIRATION RATE: 16 BRPM | WEIGHT: 152 LBS | HEIGHT: 68 IN | HEART RATE: 64 BPM

## 2017-09-07 DIAGNOSIS — T78.3XXA ANGIOEDEMA, INITIAL ENCOUNTER: ICD-10-CM

## 2017-09-07 DIAGNOSIS — Z09 HOSPITAL DISCHARGE FOLLOW-UP: ICD-10-CM

## 2017-09-07 PROCEDURE — 99203 OFFICE O/P NEW LOW 30 MIN: CPT | Performed by: NURSE PRACTITIONER

## 2017-09-07 RX ORDER — CHOLECALCIFEROL (VITAMIN D3) 125 MCG
500 CAPSULE ORAL DAILY
COMMUNITY
End: 2021-11-02

## 2017-09-07 ASSESSMENT — ENCOUNTER SYMPTOMS
SORE THROAT: 0
HEADACHES: 0
CHILLS: 0
DIARRHEA: 0
NAUSEA: 0
HEARTBURN: 0
FEVER: 0
ABDOMINAL PAIN: 0
VOMITING: 0

## 2017-09-07 ASSESSMENT — PATIENT HEALTH QUESTIONNAIRE - PHQ9: CLINICAL INTERPRETATION OF PHQ2 SCORE: 0

## 2017-09-07 NOTE — PROGRESS NOTES
Subjective:     Karina Stern is a 32 y.o. female who presents for Hospital Follow-up.  Chart reviewed. Discharge summary available for review: Yes   Date of discharge 8/31/2017.  48- hour post discharge RN call completed on 9/1/2017 and documented in the medical record by  Karthik Delarosa..    HPI: Recently hospitalized for tongue swelling, found to have angioedema. Pt denied any recent change of medication, supplement, herb, not eating anything weird. She is treated with steroid and prn benadryl. She is also noted to have UTI in the hospital, s/p oral cipro.     Since returning home, patient reports feeling better, no sign of UTI at this time. She denied any new symptoms but her tongue is still kind of swelling. She reports that swelling seems to start worsening at night time to am and then get better when get goes. She denied fever, chills. She did notice some bleeding in am. No trouble breathing, no air way obstruction.     She is supposed to be referred to allergy specialist but she did not get the order. She has no PCP and she would like to find one.     The patient denied weakness; no difficulty taking care of self at home.  Patient reports taking medications as instructed. Finished cirpro and steroid already.    Patient Active Problem List    Diagnosis Date Noted   • Angioedema 08/30/2017     Priority: High   • Raynaud phenomenon 08/30/2017     Priority: Low         Allergies:   Dilaudid [hydromorphone hcl]    Social History:  Social History   Substance Use Topics   • Smoking status: Never Smoker   • Smokeless tobacco: Never Used   • Alcohol use No        ROS:  Review of Systems   Constitutional: Negative for chills and fever.   HENT: Negative for sore throat.         Tough swelling and hard   Gastrointestinal: Negative for abdominal pain, diarrhea, heartburn, nausea and vomiting.   Neurological: Negative for headaches.        Objective:     Blood pressure (!) 90/60, pulse 64, temperature 37 °C (98.6  "°F), resp. rate 16, height 1.715 m (5' 7.5\"), weight 68.9 kg (152 lb), last menstrual period 09/04/2017, SpO2 100 %.     Physical Exam:  Physical Exam   Constitutional: She is oriented to person, place, and time and well-developed, well-nourished, and in no distress.   HENT:   Head: Normocephalic and atraumatic.   Mouth/Throat: No oropharyngeal exudate, posterior oropharyngeal edema, posterior oropharyngeal erythema or tonsillar abscesses.       Neck: Neck supple. No JVD present.   Cardiovascular: Normal rate and regular rhythm.    Pulmonary/Chest: Breath sounds normal. No respiratory distress. She has no wheezes. She has no rales.   Musculoskeletal: Normal range of motion. She exhibits no edema.   Lymphadenopathy:     She has no cervical adenopathy.   Neurological: She is alert and oriented to person, place, and time.   Skin: Skin is warm.   Vitals reviewed.     In the back of her tongue, feeling induration about quarter size. Pt does not feel the size is getting better, the size is up and own, more swelling at night and in am and starts trending down when day goes. No ulceration noted but she has small cut, red. No family hx of cancer.     Assessment and Plan:     1. Hospital discharge follow-up  Hospitalization and results reviewed with patient. High risk conditions requiring teaching or care coordination were identified and addressed.The patient demonstrate understanding of admission and underlying conditions. The patient understands discharge instructions and when to seek medical attention. Medications reviewed including instructions regarding high risk medications, dosing and side effects.    The patient is able to safely adhere to ADL/IADL, treatment and medication regimen, self-manage of high-risk conditions? Yes   The patient requires physical therapy/home health/DME referral? No   The patient requires referral to care coordination/behavioral health/social work?  No   Patient requires referral for pharmacy " consult? No   Advance directive/POLST on file?  No   Required counseled on advance directive?  No     - REFERRAL TO ALLERGY  - MA to assist pt find a new PCP.     2. Angioedema, initial encounter  - REFERRAL TO ALLERGY  - not affecting airway.     - Induration to the back of her tongue, not sure if it is bleeding inside or mass or just from swelling. Non smoker. No family hx of cancer. CBC normal, no fever, no chill, no enlarged cervical lymphoid, the size is up and down throughout the day (felt more like swelling??) l Pt is instructed to call back on Monday if the swelling or bleeding persist. Will place order to oral surgeon for second opinion and possible biopsy.       Medication Reconciliation  Medication list at end of encounter:   Current Outpatient Prescriptions   Medication Sig Dispense Refill   • cyanocobalamin (VITAMIN B-12) 500 MCG Tab Take 500 mcg by mouth every day.     • diphenhydrAMINE (BENADRYL) 25 MG capsule Take 1 Cap by mouth every 6 hours as needed for Itching. 30 Cap    • ciprofloxacin (CIPRO) 250 MG Tab Take 1 Tab by mouth 2 times a day. 6 Tab 0   • vitamin D, Ergocalciferol, (DRISDOL) 25387 units Cap capsule Take 50,000 Units by mouth every 14 days.       No current facility-administered medications for this visit.        Primary care follow-up:  New health conditions identified during hospitalization? Yes   Labs/pathology/imaging requires future PCP follow-up?  No   Changes to medications during hospitalization or today? No     Recommended followup: No Follow-up on file. with Lakshmi Landaverde M.D.       Patient Instruction  Patient offered educational material on discharge diagnosis and management of symptoms/red flags. Patient instructed to keep follow-up appointments and to bring written questions and and actual medications to each office visit. Patient instructed to call PCP/specialist with any problems/questions/concerns. Patient verbalizes understanding and has no further questions at  this time.    Face-to-face transitional care management services with moderate complexity medical decision making.

## 2017-09-07 NOTE — PROGRESS NOTES
POST DISCHARGE CALL Karthik Delarosa.   Discharge Date:8/31/2017   Date of Outreach Call: 9/5/2017  2:46 PM  Now that you're home, how are you doing? Good  Comment:Better, still some swelling. In mornings still  swollen, but goes down during the day. Wants referral to  allergist.  Do you have questions about your medications? No    Did you fill your medications? Yes    Do you have a follow-up appointment scheduled?Yes  Comment:Dc clinic on 9/7    Discharging Department: Clinical Decision Unit    Number of Attempts: 2  Current or previous attempts completed within two business days of discharge? Yes  Provided education regarding treatment plan, medication, self-management, ADLs? Yes  Has patient completed Advance Directive? If yes, advise them to bring to appointment. No  Comment:Encouraged to complete one.  Care Manager phone number provided? Yes  Comment:[phone]  Is there anything else I can help you with? *

## 2017-09-07 NOTE — PATIENT INSTRUCTIONS
If you need further assistance, or have any questions; concerns or lingering symptoms before seeing your Primary Care Provider or specialist.     Do not hesitate to contact us.     Please contact us at the Post-Hospital Follow Up Program at [phone].   Our offices hours are Monday-Friday 8 am-5 pm.    Angioedema  Angioedema is a sudden swelling of tissues, often of the skin. It can occur on the face or genitals or in the abdomen or other body parts. The swelling usually develops over a short period and gets better in 24 to 48 hours. It often begins during the night and is found when the person wakes up. The person may also get red, itchy patches of skin (hives). Angioedema can be dangerous if it involves swelling of the air passages.   Depending on the cause, episodes of angioedema may only happen once, come back in unpredictable patterns, or repeat for several years and then gradually fade away.   CAUSES   Angioedema can be caused by an allergic reaction to various triggers. It can also result from nonallergic causes, including reactions to drugs, immune system disorders, viral infections, or an abnormal gene that is passed to you from your parents (hereditary). For some people with angioedema, the cause is unknown.   Some things that can trigger angioedema include:   · Foods.    · Medicines, such as ACE inhibitors, ARBs, nonsteroidal anti-inflammatory agents, or estrogen.    · Latex.    · Animal saliva.    · Insect stings.    · Dyes used in X-rays.    · Mild injury.    · Dental work.  · Surgery.  · Stress.    · Sudden changes in temperature.    · Exercise.  SIGNS AND SYMPTOMS   · Swelling of the skin.  · Hives. If these are present, there is also intense itching.  · Redness in the affected area.    · Pain in the affected area.  · Swollen lips or tongue.  · Breathing problems. This may happen if the air passages swell.  · Wheezing.  If internal organs are involved, there may be:   · Nausea.    · Abdominal  pain.    · Vomiting.    · Difficulty swallowing.    · Difficulty passing urine.  DIAGNOSIS   · Your health care provider will examine the affected area and take a medical and family history.  · Various tests may be done to help determine the cause. Tests may include:  ¨ Allergy skin tests to see if the problem is an allergic reaction.    ¨ Blood tests to check for hereditary angioedema.    ¨ Tests to check for underlying diseases that could cause the condition.    · A review of your medicines, including over-the-counter medicines, may be done.  TREATMENT   Treatment will depend on the cause of the angioedema. Possible treatments include:   · Removal of anything that triggered the condition (such as stopping certain medicines).    · Medicines to treat symptoms or prevent attacks. Medicines given may include:    ¨ Antihistamines.    ¨ Epinephrine injection.    ¨ Steroids.    · Hospitalization may be required for severe attacks. If the air passages are affected, it can be an emergency. Tubes may need to be placed to keep the airway open.  HOME CARE INSTRUCTIONS   · Take all medicines as directed by your health care provider.  · If you were given medicines for emergency allergy treatment, always carry them with you.  · Wear a medical bracelet as directed by your health care provider.    · Avoid known triggers.  SEEK MEDICAL CARE IF:   · You have repeat attacks of angioedema.    · Your attacks are more frequent or more severe despite preventive measures.    · You have hereditary angioedema and are considering having children. It is important to discuss with your health care provider the risks of passing the condition on to your children.  SEEK IMMEDIATE MEDICAL CARE IF:   · You have severe swelling of the mouth, tongue, or lips.  · You have difficulty breathing.    · You have difficulty swallowing.    · You faint.  MAKE SURE YOU:  · Understand these instructions.  · Will watch your condition.  · Will get help right away  if you are not doing well or get worse.     This information is not intended to replace advice given to you by your health care provider. Make sure you discuss any questions you have with your health care provider.     Document Released: 02/26/2003 Document Revised: 01/08/2016 Document Reviewed: 08/11/2014  Elsevier Interactive Patient Education ©2016 [employer]

## 2017-09-15 ENCOUNTER — TELEPHONE (OUTPATIENT)
Dept: MEDICAL GROUP | Facility: CLINIC | Age: 32
End: 2017-09-15

## 2017-09-15 DIAGNOSIS — K14.8 TONGUE LESION: ICD-10-CM

## 2017-09-15 NOTE — TELEPHONE ENCOUNTER
Pt call pt to let her know I am a little bit concerned about her bleeding and also the induration I felt in the clinic.    I have placed order for her to see oral surgeon. She will get phone call from referral center. Have her call us back if she does not hear anything about oral surgeon referral in two weeks.    Thanks.  FENG (Francine, APRN

## 2017-09-15 NOTE — TELEPHONE ENCOUNTER
1. Caller Name: Karina Stern                      Call Back Number: [phone] (home)     2. Message: pt called to update you, she is feeling stable. The angioedema is now bleeding every other day. Unable to schedule with allergist yet, will continue to try to schedule an apt soon. Swelling is the same there is no change. Thank you    3. Patient approves office to leave a detailed voicemail/MyChart message: yes

## 2017-09-19 ENCOUNTER — APPOINTMENT (OUTPATIENT)
Dept: MEDICAL GROUP | Facility: PHYSICIAN GROUP | Age: 32
End: 2017-09-19
Payer: COMMERCIAL

## 2017-10-03 ENCOUNTER — OFFICE VISIT (OUTPATIENT)
Dept: MEDICAL GROUP | Facility: PHYSICIAN GROUP | Age: 32
End: 2017-10-03
Payer: COMMERCIAL

## 2017-10-03 VITALS
WEIGHT: 153 LBS | TEMPERATURE: 97.8 F | RESPIRATION RATE: 14 BRPM | DIASTOLIC BLOOD PRESSURE: 70 MMHG | SYSTOLIC BLOOD PRESSURE: 108 MMHG | HEIGHT: 68 IN | HEART RATE: 79 BPM | BODY MASS INDEX: 23.19 KG/M2 | OXYGEN SATURATION: 100 %

## 2017-10-03 DIAGNOSIS — I73.00 RAYNAUD'S PHENOMENON WITHOUT GANGRENE: ICD-10-CM

## 2017-10-03 DIAGNOSIS — T78.3XXD ANGIOEDEMA, SUBSEQUENT ENCOUNTER: ICD-10-CM

## 2017-10-03 DIAGNOSIS — K14.8 TONGUE LESION: ICD-10-CM

## 2017-10-03 PROCEDURE — 99214 OFFICE O/P EST MOD 30 MIN: CPT | Performed by: NURSE PRACTITIONER

## 2017-10-03 ASSESSMENT — PAIN SCALES - GENERAL: PAINLEVEL: NO PAIN

## 2017-10-03 NOTE — LETTER
[facility]  JANET WilderP.RKATIE  [address]  [place] [address]  Fax: [phone]   Authorization for Release/Disclosure of   Protected Health Information   Name: AYDEE CHURCH : 1985 SSN: [id number]   Address: [address] [place] [id number] Phone:    [phone] (home)    I authorize the entity listed below to release/disclose the PHI below to:   [facility]/Vahe Stewart A.P.R.JEFE and KARIME Wilder.P.RROSSY.   Provider or Entity Name:  [facility]       [facility], [facility], [facility]   Phone:      Fax:     Reason for request: continuity of care   Information to be released:    [  ] LAST COLONOSCOPY,  including any PATH REPORT and follow-up  [  ] LAST FIT/COLOGUARD RESULT [  ] LAST DEXA  [  ] LAST MAMMOGRAM  [X  ] LAST PAP  [  ] LAST LABS [  ] RETINA EXAM REPORT  [  ] IMMUNIZATION RECORDS  [  ] Release all info      [  ] Check here and initial the line next to each item to release ALL health information INCLUDING  _____ Care and treatment for drug and / or alcohol abuse  _____ HIV testing, infection status, or AIDS  _____ Genetic Testing    DATES OF SERVICE OR TIME PERIOD TO BE DISCLOSED: _____________  I understand and acknowledge that:  * This Authorization may be revoked at any time by you in writing, except if your health information has already been used or disclosed.  * Your health information that will be used or disclosed as a result of you signing this authorization could be re-disclosed by the recipient. If this occurs, your re-disclosed health information may no longer be protected by State or Federal laws.  * You may refuse to sign this Authorization. Your refusal will not affect your ability to obtain treatment.  * This Authorization becomes effective upon signing and will  on (date) __________.      If no date is indicated, this Authorization will  one (1) year from the signature date.    Name: Aydee  Jarred    Signature:   Date:     10/3/2017       PLEASE FAX REQUESTED RECORDS BACK TO: [phone]

## 2017-10-03 NOTE — ASSESSMENT & PLAN NOTE
Currently tongue is within normal limits, she is no longer noticing bleeding. States that she did have severe swelling which cycled daily to every other day then swelling  Subsided. Ulcer is healing, is currently healing is skin color, but has not decreased in size. No known allergen or specific exposure. Patient did not note any other symptoms. Left side was swollen, but right was WNL. Did not notice anything that would trigger the swelling. Noticed it would decrease if it bled. States she would sometimes eat to cause bleeding, states it stopped swelling 9/15. Toward the end it would be less swollen. Was very painful. Tried ibuprofen/benadryl, but these did not help.

## 2017-10-04 NOTE — PROGRESS NOTES
Chief Complaint   Patient presents with   • Establish Care     New Patient    • Oral Swelling     x 1 month        HISTORY OF THE PRESENT ILLNESS: This is a 32 y.o. female new patient to our practice. This pleasant patient is here todayTo discuss swelling of her tongue/large lesion on the underside of her tongue.    Angioedema  Currently tongue is within normal limits, she is no longer noticing bleeding. States that she did have severe swelling which cycled daily to every other day then swelling  Subsided. Ulcer is healing, is currently healing is skin color, but has not decreased in size. No known allergen or specific exposure. Patient did not note any other symptoms. Left side was swollen, but right was WNL. Did not notice anything that would trigger the swelling. Noticed it would decrease if it bled. States she would sometimes eat to cause bleeding, states it stopped swelling 9/15. Toward the end it would be less swollen. Was very painful. Tried ibuprofen/benadryl, but these did not help.    Raynaud phenomenon  Chronic in nature. Follows with rheumatology.       No past medical history on file.    Past Surgical History:   Procedure Laterality Date   • CHOLECYSTECTOMY     • ERCP     • PRIMARY C SECTION         Family Status   Relation Status   • Mother Alive   • Father Alive   History reviewed. No pertinent family history.    Social History   Substance Use Topics   • Smoking status: Never Smoker   • Smokeless tobacco: Never Used   • Alcohol use No       Allergies: Dilaudid [hydromorphone hcl]    Current Outpatient Prescriptions Ordered in [facility]   Medication Sig Dispense Refill   • cyanocobalamin (VITAMIN B-12) 500 MCG Tab Take 500 mcg by mouth every day.     • diphenhydrAMINE (BENADRYL) 25 MG capsule Take 1 Cap by mouth every 6 hours as needed for Itching. 30 Cap    • vitamin D, Ergocalciferol, (DRISDOL) 30356 units Cap capsule Take 50,000 Units by mouth every 14 days.       No current Epic-ordered  "facility-administered medications on file.        Review of Systems   Constitutional:  Negative for fever, chills, weight loss and malaise/fatigue.   HENT:  Negative for ear pain, nosebleeds, congestion, sore throat and neck pain.    Eyes:  Negative for blurred vision.   Respiratory:  Negative for cough, sputum production, shortness of breath and wheezing.    Cardiovascular:  Negative for chest pain, palpitations, orthopnea and leg swelling.   Gastrointestinal:  Negative for heartburn, nausea, vomiting and abdominal pain.   Genitourinary:  Negative for dysuria, urgency and frequency.   Musculoskeletal:  Negative for myalgias, back pain and joint pain.   Skin:  Negative for rash and itching.   Neurological:  Negative for dizziness, tingling, tremors, sensory change, focal weakness and headaches.   Endo/Heme/Allergies:  Does not bruise/bleed easily.   Psychiatric/Behavioral:  Negative for depression, anxiety, or memory loss.     All other systems reviewed and are negative except as in HPI.    Exam: Blood pressure 108/70, pulse 79, temperature 36.6 °C (97.8 °F), resp. rate 14, height 1.715 m (5' 7.52\"), weight 69.4 kg (153 lb), last menstrual period 09/29/2017, SpO2 100 %, not currently breastfeeding.  General:  Normal appearing. No distress.  HEENT:  Normocephalic. Eyes conjunctiva clear lids without ptosis, pupils equal and reactive to light accommodation, ears normal shape and contour, canals are clear bilaterally, tympanic membranes are benign, nasal mucosa benign, oropharynx is without erythema, edema or exudates. Tongue generally appears within normal limits pink, moist, no areas of irritation on superior surface of the tongue. A large oval raised firm lesion is noted on the underside of patient's tongue to the left side lesion is approximately 2 inches in length and half an inch in diameter. There is no bleeding, signs of infection including drainage from the lesion on palpation.  Neck:  Supple without JVD or " bruit. Thyroid is not enlarged.  Pulmonary:  Clear to ausculation.  Normal effort. No rales, ronchi, or wheezing.  Cardiovascular:  Regular rate and rhythm without murmur. Carotid and radial pulses are intact and equal bilaterally.  Neurologic:  Grossly nonfocal  Lymph:  No cervical, supraclavicular or axillary lymph nodes are palpable  Skin:  Warm and dry.  No obvious lesions.  Musculoskeletal:  Normal gait. No extremity cyanosis, clubbing, or edema.  Psych:  Normal mood and affect. Alert and oriented x3. Judgment and insight is normal.    PLAN:    1. Angioedema, subsequent encounter  Lesion on patient's left tongue that needs biopsy patient has a referral to an oral surgeon and will call to attempt to make an appointment tomorrow patient will notify this provider if she is unable to make this appointment. Urgent referral to ENT is placed at this time as it will be important for patient to have a biopsy as soon as possible, and patient would like to discuss this with ENT. A new referral is also place her allergy specialist as patient has had issues with allergies and swelling in the past.  - REFERRAL TO ENT    2. Raynaud's phenomenon without gangrene  Patient will continue current plan of care and follow-up with rheumatology.    Follow-up in one month or sooner as possible. Patient is encouraged to be seen in the emergency room for chest pain, palpitations, shortness of breath, dizziness, severe abdominal pain or other concerning symptoms.    Please note that this dictation was created using voice recognition software. I have made every reasonable attempt to correct obvious errors, but I expect that there are errors of grammar and possibly content that I did not discover before finalizing the note.      Assessment/Plan  1. Angioedema, subsequent encounter  REFERRAL TO ENT   2. Raynaud's phenomenon without gangrene           I have placed the below orders and discussed them with an approved delegating provider. The  MA is performing the below orders under the direction of Dr. Hogan.

## 2017-11-03 ENCOUNTER — HOSPITAL ENCOUNTER (OUTPATIENT)
Dept: LAB | Facility: MEDICAL CENTER | Age: 32
End: 2017-11-03
Attending: INTERNAL MEDICINE
Payer: COMMERCIAL

## 2017-11-03 LAB
C3 SERPL-MCNC: 102 MG/DL (ref 87–200)
C4 SERPL-MCNC: 24 MG/DL (ref 19–52)
CRP SERPL HS-MCNC: 0.19 MG/DL (ref 0–0.75)
ERYTHROCYTE [SEDIMENTATION RATE] IN BLOOD BY WESTERGREN METHOD: 22 MM/HOUR (ref 0–20)

## 2017-11-03 PROCEDURE — 36415 COLL VENOUS BLD VENIPUNCTURE: CPT

## 2017-11-03 PROCEDURE — 83520 IMMUNOASSAY QUANT NOS NONAB: CPT

## 2017-11-03 PROCEDURE — 86255 FLUORESCENT ANTIBODY SCREEN: CPT

## 2017-11-03 PROCEDURE — 86161 COMPLEMENT/FUNCTION ACTIVITY: CPT

## 2017-11-03 PROCEDURE — 86160 COMPLEMENT ANTIGEN: CPT

## 2017-11-03 PROCEDURE — 82784 ASSAY IGA/IGD/IGG/IGM EACH: CPT

## 2017-11-03 PROCEDURE — 82785 ASSAY OF IGE: CPT

## 2017-11-03 PROCEDURE — 85652 RBC SED RATE AUTOMATED: CPT

## 2017-11-03 PROCEDURE — 86140 C-REACTIVE PROTEIN: CPT

## 2017-11-05 LAB
C1INH FUNCTIONAL/C1INH TOTAL MFR SERPL: 86 %
C1INH SERPL-MCNC: 30 MG/DL (ref 21–39)
IGG SERPL-MCNC: 1030 MG/DL (ref 768–1632)

## 2017-11-06 LAB
ANCA IGG TITR SER IF: NORMAL {TITER}
IGE SERPL-ACNC: 18 KU/L
TRYPTASE SERPL-MCNC: <2 UG/L

## 2017-11-11 LAB
MISCELLANEOUS LAB RESULT MISCLAB: NORMAL
MISCELLANEOUS LAB RESULT MISCLAB: NORMAL

## 2017-12-20 ENCOUNTER — OFFICE VISIT (OUTPATIENT)
Dept: URGENT CARE | Facility: PHYSICIAN GROUP | Age: 32
End: 2017-12-20
Payer: COMMERCIAL

## 2017-12-20 VITALS
SYSTOLIC BLOOD PRESSURE: 100 MMHG | WEIGHT: 152 LBS | BODY MASS INDEX: 23.04 KG/M2 | TEMPERATURE: 98.7 F | DIASTOLIC BLOOD PRESSURE: 70 MMHG | OXYGEN SATURATION: 96 % | HEIGHT: 68 IN | RESPIRATION RATE: 14 BRPM | HEART RATE: 85 BPM

## 2017-12-20 DIAGNOSIS — J02.9 PHARYNGITIS, UNSPECIFIED ETIOLOGY: ICD-10-CM

## 2017-12-20 PROCEDURE — 99213 OFFICE O/P EST LOW 20 MIN: CPT | Performed by: PHYSICIAN ASSISTANT

## 2017-12-20 RX ORDER — FLUTICASONE PROPIONATE 50 MCG
1 SPRAY, SUSPENSION (ML) NASAL DAILY
COMMUNITY
End: 2021-11-02

## 2017-12-20 ASSESSMENT — ENCOUNTER SYMPTOMS
SINUS PAIN: 0
COUGH: 1
SHORTNESS OF BREATH: 0
CHILLS: 0
SORE THROAT: 1
MYALGIAS: 0
WHEEZING: 0
FEVER: 0
SPUTUM PRODUCTION: 0

## 2017-12-20 NOTE — PATIENT INSTRUCTIONS
Pharyngitis  Pharyngitis is redness, pain, and swelling (inflammation) of your pharynx.   CAUSES   Pharyngitis is usually caused by infection. Most of the time, these infections are from viruses (viral) and are part of a cold. However, sometimes pharyngitis is caused by bacteria (bacterial). Pharyngitis can also be caused by allergies. Viral pharyngitis may be spread from person to person by coughing, sneezing, and personal items or utensils (cups, forks, spoons, toothbrushes). Bacterial pharyngitis may be spread from person to person by more intimate contact, such as kissing.   SIGNS AND SYMPTOMS   Symptoms of pharyngitis include:    · Sore throat.    · Tiredness (fatigue).    · Low-grade fever.    · Headache.  · Joint pain and muscle aches.  · Skin rashes.  · Swollen lymph nodes.  · Plaque-like film on throat or tonsils (often seen with bacterial pharyngitis).  DIAGNOSIS   Your health care provider will ask you questions about your illness and your symptoms. Your medical history, along with a physical exam, is often all that is needed to diagnose pharyngitis. Sometimes, a rapid strep test is done. Other lab tests may also be done, depending on the suspected cause.   TREATMENT   Viral pharyngitis will usually get better in 3-4 days without the use of medicine. Bacterial pharyngitis is treated with medicines that kill germs (antibiotics).   HOME CARE INSTRUCTIONS   · Drink enough water and fluids to keep your urine clear or pale yellow.    · Only take over-the-counter or prescription medicines as directed by your health care provider:    ¨ If you are prescribed antibiotics, make sure you finish them even if you start to feel better.    ¨ Do not take aspirin.    · Get lots of rest.    · Gargle with 8 oz of salt water (½ tsp of salt per 1 qt of water) as often as every 1-2 hours to soothe your throat.    · Throat lozenges (if you are not at risk for choking) or sprays may be used to soothe your throat.  SEEK MEDICAL  CARE IF:   · You have large, tender lumps in your neck.  · You have a rash.  · You cough up green, yellow-brown, or bloody spit.  SEEK IMMEDIATE MEDICAL CARE IF:   · Your neck becomes stiff.  · You drool or are unable to swallow liquids.  · You vomit or are unable to keep medicines or liquids down.  · You have severe pain that does not go away with the use of recommended medicines.  · You have trouble breathing (not caused by a stuffy nose).  MAKE SURE YOU:   · Understand these instructions.  · Will watch your condition.  · Will get help right away if you are not doing well or get worse.     This information is not intended to replace advice given to you by your health care provider. Make sure you discuss any questions you have with your health care provider.     Document Released: 12/18/2006 Document Revised: 10/08/2014 Document Reviewed: 08/25/2014  [employer] Interactive Patient Education ©2016 [employer]    Otalgia  Otalgia is pain in or around the ear. When the pain is from the ear itself it is called primary otalgia. Pain may also be coming from somewhere else, like the head and neck. This is called secondary otalgia.   CAUSES   Causes of primary otalgia include:  · Middle ear infection.  · It can also be caused by injury to the ear or infection of the ear canal (swimmer's ear). Swimmer's ear causes pain, swelling and often drainage from the ear canal.  Causes of secondary otalgia include:  · Sinus infections.  · Allergies and colds that cause stuffiness of the nose and tubes that drain the ears (eustachian tubes).  · Dental problems like cavities, gum infections or teething.  · Sore Throat (tonsillitis and pharyngitis).  · Swollen glands in the neck.  · Infection of the bone behind the ear (mastoiditis).  · TMJ discomfort (problems with the joint between your jaw and your skull).  · Other problems such as nerve disorders, circulation problems, heart disease and tumors of the head and neck can also cause  symptoms of ear pain. This is rare.  DIAGNOSIS   Evaluation, Diagnosis and Testing:  · Examination by your medical caregiver is recommended to evaluate and diagnose the cause of otalgia.  · Further testing or referral to a specialist may be indicated if the cause of the ear pain is not found and the symptom persists.  TREATMENT   · Your doctor may prescribe antibiotics if an ear infection is diagnosed.  · Pain relievers and topical analgesics may be recommended.  · It is important to take all medications as prescribed.  HOME CARE INSTRUCTIONS   · It may be helpful to sleep with the painful ear in the up position.  · A warm compress over the painful ear may provide relief.  · A soft diet and avoiding gum may help while ear pain is present.  SEEK IMMEDIATE MEDICAL CARE IF:  · You develop severe pain, a high fever, repeated vomiting or dehydration.  · You develop extreme dizziness, headache, confusion, ringing in the ears (tinnitus) or hearing loss.  Document Released: 01/25/2006 Document Revised: 03/11/2013 Document Reviewed: 10/27/2010  [employer]® Patient Information ©2014 [employer], [employer].

## 2017-12-20 NOTE — PROGRESS NOTES
"Subjective:      Karina Stern is a 32 y.o. female who presents with Otalgia (sore throat x 2 days)            2 day history of ear pain/pressure and sore throat. Also reports some rhinorrhea and congestion. No fevers or chills. She is getting ready to go out of town and would like to make sure that she does not have an infection. She has tried DayQuil with minimal improvement in symptoms.        Review of Systems   Constitutional: Negative for chills and fever.   HENT: Positive for congestion, ear pain and sore throat. Negative for ear discharge and sinus pain.    Respiratory: Positive for cough. Negative for sputum production, shortness of breath and wheezing.    Musculoskeletal: Negative for myalgias.     Allergies:Dilaudid [hydromorphone hcl]    Current Outpatient Prescriptions Ordered in [facility]   Medication Sig Dispense Refill   • fluticasone (FLONASE) 50 MCG/ACT nasal spray Spray 1 Spray in nose every day.     • cyanocobalamin (VITAMIN B-12) 500 MCG Tab Take 500 mcg by mouth every day.     • vitamin D, Ergocalciferol, (DRISDOL) 92243 units Cap capsule Take 50,000 Units by mouth every 14 days.       No current [facility]-ordered facility-administered medications on file.        History reviewed. No pertinent past medical history.    Social History   Substance Use Topics   • Smoking status: Never Smoker   • Smokeless tobacco: Never Used   • Alcohol use No       Family Status   Relation Status   • Mother Alive   • Father Alive   History reviewed. No pertinent family history.       Objective:     /70   Pulse 85   Temp 37.1 °C (98.7 °F)   Resp 14   Ht 1.715 m (5' 7.5\")   Wt 68.9 kg (152 lb)   SpO2 96%   BMI 23.46 kg/m²      Physical Exam   Constitutional: She is oriented to person, place, and time. She appears well-developed and well-nourished. No distress.   HENT:   Head: Normocephalic and atraumatic.   Right Ear: External ear normal.   Left Ear: External ear normal.   Mouth/Throat: Oropharynx is clear " and moist.   Canals unremarkable. Tympanic membranes slightly dull without erythema. Nasal mucosal edema, worse on the right side. Very mild posterior pharyngeal erythema with clear postnasal drainage visible   Neck: Normal range of motion. Neck supple.   Cardiovascular: Normal rate.    Pulmonary/Chest: Effort normal.   Neurological: She is alert and oriented to person, place, and time.   Skin: Skin is warm and dry. She is not diaphoretic.   Psychiatric: She has a normal mood and affect. Her behavior is normal. Judgment and thought content normal.   Nursing note and vitals reviewed.              Assessment/Plan:     1. Pharyngitis, unspecified etiology      Ongoing for 2 days. Very mild erythema without edema or exudate. Discussed symptomatic therapies and given written instructions. Follow-up with PCP as needed       [employer] Interactive Patient Education given:Pharyngitis    Please note that this dictation was created using voice recognition software. I have made every reasonable attempt to correct obvious errors, but I expect that there are errors of grammar and possibly content that I did not discover before finalizing the note.

## 2018-08-13 ENCOUNTER — OFFICE VISIT (OUTPATIENT)
Dept: MEDICAL GROUP | Facility: PHYSICIAN GROUP | Age: 33
End: 2018-08-13
Payer: COMMERCIAL

## 2018-08-13 VITALS
SYSTOLIC BLOOD PRESSURE: 102 MMHG | HEART RATE: 63 BPM | OXYGEN SATURATION: 98 % | HEIGHT: 68 IN | TEMPERATURE: 98.5 F | RESPIRATION RATE: 16 BRPM | BODY MASS INDEX: 23.04 KG/M2 | WEIGHT: 152 LBS | DIASTOLIC BLOOD PRESSURE: 72 MMHG

## 2018-08-13 DIAGNOSIS — R42 VERTIGO: ICD-10-CM

## 2018-08-13 DIAGNOSIS — R53.83 FATIGUE, UNSPECIFIED TYPE: ICD-10-CM

## 2018-08-13 DIAGNOSIS — M79.602 PAIN IN BOTH UPPER EXTREMITIES: ICD-10-CM

## 2018-08-13 DIAGNOSIS — Z00.00 ROUTINE MEDICAL EXAM: ICD-10-CM

## 2018-08-13 DIAGNOSIS — M79.601 PAIN IN BOTH UPPER EXTREMITIES: ICD-10-CM

## 2018-08-13 PROCEDURE — 99214 OFFICE O/P EST MOD 30 MIN: CPT | Performed by: NURSE PRACTITIONER

## 2018-08-13 RX ORDER — MECLIZINE HYDROCHLORIDE 25 MG/1
25 TABLET ORAL 3 TIMES DAILY PRN
Qty: 30 TAB | Refills: 0 | Status: SHIPPED | OUTPATIENT
Start: 2018-08-13 | End: 2021-11-02

## 2018-08-13 ASSESSMENT — PATIENT HEALTH QUESTIONNAIRE - PHQ9: CLINICAL INTERPRETATION OF PHQ2 SCORE: 0

## 2018-08-13 ASSESSMENT — PAIN SCALES - GENERAL: PAINLEVEL: NO PAIN

## 2018-08-13 NOTE — PROGRESS NOTES
"Chief Complaint   Patient presents with   • Dizziness     x 6 months comes and goes    • Arm Pain     and legs both  x 2 months        HISTORY OF PRESENT ILLNESS: Patient is a 33 y.o. female established patient who presents today to discuss pain and vertigo.     Pain in both upper extremities  This is a new problem over the last 2 months. States that this is intermittent. States it will happen randomly. More often the right arm. Aching like you could rub it to make it go away. States it feels \"bruised\". States it is 6/10 at it's worst. No correlation to use or exercise. States it happens at work or home. No fevers/chills. No numbness. Not positional changes in symptoms, no injury to arm or neck. States she has cut dairy out recently related to nausea/diarrhea/bloating but denies other changes in diet, denies increased alcohol use.  She is not currently having any diarrhea or constipation, states that she has not had any neck or back pain states that there is no specific time of day when she notices symptoms has not had any headaches, blurry vision, change in cognition.     Vertigo  This is a new problem. Started recently unsure of dates. States the room will start spinning she will feel hot and flushed. States it is random, feels like she needs to lie down. States she doesn't notice if it is related to moving her head, states she does notice it sometimes when she stands quickly. States it usually lasts 15-20 minutes. States it she sits still it will last 5 min or less. No tinnitus. States she will notice nausea. No change in hearing. She hasn't noticed any consistent trigger. Sometimes HA. No increased headaches. Hasn't been more congested lately or had any allergic symptoms. States she has had some nausea.      Patient Active Problem List    Diagnosis Date Noted   • Angioedema 08/30/2017     Priority: High   • Raynaud phenomenon 08/30/2017     Priority: Low   • Pain in both upper extremities 08/13/2018   • Vertigo " 08/13/2018   • Tongue lesion 10/03/2017       Allergies:Dilaudid [hydromorphone hcl]    Current Outpatient Prescriptions   Medication Sig Dispense Refill   • meclizine (ANTIVERT) 25 MG Tab Take 1 Tab by mouth 3 times a day as needed. 30 Tab 0   • fluticasone (FLONASE) 50 MCG/ACT nasal spray Spray 1 Spray in nose every day.     • cyanocobalamin (VITAMIN B-12) 500 MCG Tab Take 500 mcg by mouth every day.     • vitamin D, Ergocalciferol, (DRISDOL) 61323 units Cap capsule Take 50,000 Units by mouth every 14 days.       No current facility-administered medications for this visit.        Social History   Substance Use Topics   • Smoking status: Never Smoker   • Smokeless tobacco: Never Used   • Alcohol use No       Family Status   Relation Status   • Mo Alive   • Fa Alive   History reviewed. No pertinent family history.    Review of Systems:   Constitutional:  Negative for fever, chills, weight loss and malaise/fatigue.   HEENT:  Negative for ear pain, nosebleeds, congestion, sore throat and neck pain.    Eyes:  Negative for blurred vision.   Respiratory:  Negative for cough, sputum production, shortness of breath and wheezing.    Cardiovascular:  Negative for chest pain, palpitations, orthopnea and leg swelling.   Gastrointestinal:  Negative for heartburn, nausea, vomiting and abdominal pain.   Genitourinary:  Negative for dysuria, urgency and frequency.   Musculoskeletal: Positive for myalgias, negative back pain and joint pain.   Skin:  Negative for rash and itching.   Neurological:  positive for dizziness, negative tingling, tremors, sensory change, focal weakness and headaches.   Endo/Heme/Allergies:  Does not bruise/bleed easily.   Psychiatric/Behavioral:  Negative for depression, suicidal ideas and memory loss.  The patient is not nervous/anxious and does not have insomnia.    All other systems reviewed and are negative except as in HPI.    Exam:  Blood pressure 102/72, pulse 63, temperature 36.9 °C (98.5 °F),  "resp. rate 16, height 1.715 m (5' 7.52\"), weight 68.9 kg (152 lb), SpO2 98 %, not currently breastfeeding.  General:  Normal appearing. No distress.  HEENT:  Normocephalic. Eyes conjunctiva clear lids without ptosis, pupils equal and reactive to light accommodation, ears normal shape and contour, canals are clear bilaterally, tympanic membranes are right clear effusion left is benign, nasal mucosa benign, oropharynx is without erythema, edema or exudates.   Neck:  Supple without JVD or bruit. Thyroid is not enlarged.  Pulmonary:  Clear to ausculation.  Normal effort. No rales, ronchi, or wheezing.  Cardiovascular:  Regular rate and rhythm without murmur. Carotid and radial pulses are intact and equal bilaterally.  Abdomen:  Soft, nontender, nondistended. Normal bowel sounds. Liver and spleen are not palpable  Neurologic:  Grossly nonfocal.  Cranial nerves II through XII intact, negative nystagmus to epley maneuver.   Lymph:  No cervical, supraclavicular or axillary lymph nodes are palpable  Skin:  Warm and dry.  No obvious lesions.  Musculoskeletal:  Normal gait. No extremity cyanosis, clubbing, or edema. Elbows/arms: No deformity, swelling or bruising noted at any joints on bilateral arms.  Negative tenderness to palpation bilateral upper and lower arms. Full range of motion bilaterally elbows, wrists, shoulders. 2+ biceps deep tendon reflexes bilaterally. 5/5 strength bilaterally.  Psych:  Normal mood and affect. Alert and oriented x3. Judgment and insight is normal.      PLAN:    1. Pain in both upper extremities  Plan to assess labs discussed with patient writing down episodes of pain, attempting to track triggers.    Discussed letting this provider know if she has any worsening symptoms, numbness or tingling.   Gave patient reassurance regarding assessment within normal limits.    2. Vertigo  Counseled patient regarding monitoring symptoms, drinking plenty of fluids, use of meclizine as needed discuss symptoms " that would require follow-up in the emergency room including severe dizziness, headache.  Unable to elicit dizziness at this visit and neurological exam is reassuring at this time.  - meclizine (ANTIVERT) 25 MG Tab; Take 1 Tab by mouth 3 times a day as needed.  Dispense: 30 Tab; Refill: 0    3. Fatigue, unspecified type  - TSH; Future  - VITAMIN D,25 HYDROXY; Future  - VITAMIN B12; Future    4. Routine medical exam  - CBC WITH DIFFERENTIAL; Future  - COMP METABOLIC PANEL; Future  - LIPID PROFILE; Future    Patient will complete labs, follow-up regarding these or any worsening symptoms. Patient is encouraged to be seen in the emergency room for chest pain, palpitations, shortness of breath, dizziness, severe abdominal pain or other concerning symptoms.    Please note that this dictation was created using voice recognition software. I have made every reasonable attempt to correct obvious errors, but I expect that there are errors of grammar and possibly content that I did not discover before finalizing the note.    Assessment/Plan:  1. Pain in both upper extremities     2. Vertigo  meclizine (ANTIVERT) 25 MG Tab   3. Fatigue, unspecified type  TSH    VITAMIN D,25 HYDROXY    VITAMIN B12   4. Routine medical exam  CBC WITH DIFFERENTIAL    COMP METABOLIC PANEL    LIPID PROFILE          I have placed the below orders and discussed them with an approved delegating provider. The MA is performing the below orders under the direction of Dr. Hogan.

## 2018-08-13 NOTE — ASSESSMENT & PLAN NOTE
"This is a new problem over the last 2 months. States that this is intermittent. States it will happen randomly. More often the right arm. Aching like you could rub it to make it go away. States it feels \"bruised\". States it is 6/10 at it's worst. No correlation to use or exercise. States it happens at work or home. No fevers/chills. No numbness. Not positional changes in symptoms, no injury to arm or neck. States she has cut dairy out recently related to nausea/diarrhea/bloating but denies other changes in diet, denies increased alcohol use.  She is not currently having any diarrhea or constipation, states that she has not had any neck or back pain states that there is no specific time of day when she notices symptoms has not had any headaches, blurry vision, change in cognition.   "

## 2018-08-13 NOTE — ASSESSMENT & PLAN NOTE
This is a new problem. Started recently unsure of dates. States the room will start spinning she will feel hot and flushed. States it is random, feels like she needs to lie down. States she doesn't notice if it is related to moving her head, states she does notice it sometimes when she stands quickly. States it usually lasts 15-20 minutes. States it she sits still it will last 5 min or less. No tinnitus. States she will notice nausea. No change in hearing. She hasn't noticed any consistent trigger. Sometimes HA. No increased headaches. Hasn't been more congested lately or had any allergic symptoms. States she has had some nausea.

## 2018-11-03 ENCOUNTER — OFFICE VISIT (OUTPATIENT)
Dept: URGENT CARE | Facility: PHYSICIAN GROUP | Age: 33
End: 2018-11-03
Payer: COMMERCIAL

## 2018-11-03 VITALS
BODY MASS INDEX: 23.64 KG/M2 | HEART RATE: 100 BPM | TEMPERATURE: 98.2 F | OXYGEN SATURATION: 95 % | WEIGHT: 156 LBS | SYSTOLIC BLOOD PRESSURE: 114 MMHG | DIASTOLIC BLOOD PRESSURE: 76 MMHG | HEIGHT: 68 IN | RESPIRATION RATE: 14 BRPM

## 2018-11-03 DIAGNOSIS — J02.0 ACUTE STREPTOCOCCAL PHARYNGITIS: ICD-10-CM

## 2018-11-03 LAB
INT CON NEG: NEGATIVE
INT CON POS: POSITIVE
S PYO AG THROAT QL: POSITIVE

## 2018-11-03 PROCEDURE — 87880 STREP A ASSAY W/OPTIC: CPT | Performed by: FAMILY MEDICINE

## 2018-11-03 PROCEDURE — 99214 OFFICE O/P EST MOD 30 MIN: CPT | Performed by: FAMILY MEDICINE

## 2018-11-03 RX ORDER — AMOXICILLIN 500 MG/1
CAPSULE ORAL
Qty: 20 CAP | Refills: 0 | Status: SHIPPED | OUTPATIENT
Start: 2018-11-03 | End: 2021-11-02

## 2018-11-03 NOTE — PROGRESS NOTES
Chief Complaint:    Chief Complaint   Patient presents with   • Pharyngitis       History of Present Illness:    This is a new problem. Symptoms since 11/1/18. She has had subjective fever, pressure in her head, and sore throat (worst symptom). Not getting better. Daughter also being seen today with similar symptoms since 10/30/18.      Review of Systems:    Constitutional: See HPI.  Eyes: Negative for change in vision, photophobia, pain, redness, and discharge.  ENT: See HPI.    Respiratory: Negative for cough, hemoptysis, sputum production, shortness of breath, wheezing, and stridor.    Cardiovascular: Negative for chest pain, palpitations, orthopnea, claudication, leg swelling, and PND.   Gastrointestinal: Negative for abdominal pain, nausea, vomiting, diarrhea, constipation, blood in stool, and melena.   Genitourinary: Negative for dysuria, urinary urgency, urinary frequency, hematuria, and flank pain.   Musculoskeletal: Negative for myalgias, joint pain, neck pain, and back pain.   Skin: Negative for rash and itching.   Neurological: No new symptoms.   Endo: Negative for polydipsia.   Heme: Does not bruise/bleed easily.   Psychiatric/Behavioral: Negative for depression, suicidal ideas, hallucinations, memory loss and substance abuse. The patient is not nervous/anxious and does not have insomnia.        Past Medical History:    History reviewed. No pertinent past medical history.    Past Surgical History:    Past Surgical History:   Procedure Laterality Date   • CHOLECYSTECTOMY     • ERCP     • PRIMARY C SECTION       Social History:    Social History     Social History   • Marital status: Single     Spouse name: N/A   • Number of children: N/A   • Years of education: N/A     Occupational History   • Not on file.     Social History Main Topics   • Smoking status: Never Smoker   • Smokeless tobacco: Never Used   • Alcohol use No   • Drug use: No   • Sexual activity: Not on file     Other Topics Concern   • Not on  "file     Social History Narrative   • No narrative on file     Family History:    History reviewed. No pertinent family history.    Medications:    Current Outpatient Prescriptions on File Prior to Visit   Medication Sig Dispense Refill   • meclizine (ANTIVERT) 25 MG Tab Take 1 Tab by mouth 3 times a day as needed. 30 Tab 0   • fluticasone (FLONASE) 50 MCG/ACT nasal spray Spray 1 Spray in nose every day.     • cyanocobalamin (VITAMIN B-12) 500 MCG Tab Take 500 mcg by mouth every day.     • vitamin D, Ergocalciferol, (DRISDOL) 94619 units Cap capsule Take 50,000 Units by mouth every 14 days.       No current facility-administered medications on file prior to visit.      Allergies:    Allergies   Allergen Reactions   • Dilaudid [Hydromorphone Hcl]        Vitals:    Vitals:    11/03/18 0922   BP: 114/76   BP Location: Right arm   Patient Position: Sitting   BP Cuff Size: Adult   Pulse: 100   Resp: 14   Temp: 36.8 °C (98.2 °F)   TempSrc: Temporal   SpO2: 95%   Weight: 70.8 kg (156 lb)   Height: 1.715 m (5' 7.5\")       Physical Exam:    Constitutional: Vital signs reviewed. Appears well-developed and well-nourished. No acute distress.   Eyes: Sclera white, conjunctivae clear.   ENT: External ears normal. External auditory canals normal without discharge. TMs translucent and non-bulging. Hearing normal. Nasal mucosa pink. Lips/teeth are normal. Oral mucosa pink and moist. Posterior pharynx and tonsils: mildly erythematous with mild exudate bilaterally.  Neck: Neck supple.   Cardiovascular: Regular rate and rhythm. No murmur.  Pulmonary/Chest: Respirations non-labored. Clear to auscultation bilaterally.  Lymph: Cervical nodes without tenderness or enlargement.  Musculoskeletal: Normal gait. Normal range of motion. No muscular atrophy or weakness.  Neurological: Alert and oriented to person, place, and time. Muscle tone normal. Coordination normal.   Skin: No rashes or lesions. Warm, dry, normal turgor.  Psychiatric: Normal " mood and affect. Behavior is normal. Judgment and thought content normal.     Diagnostics:    POCT RAPID STREP A (Order #[id number]) on 11/3/18   Component Results     Component   Rapid Strep Screen   Positive    Internal Control Positive   Positive    Internal Control Negative   Negative    Last Resulted Time   Sat Nov 3, 2018  9:46 AM       Assessment / Plan:    1. Acute streptococcal pharyngitis  - POCT Rapid Strep A  - amoxicillin (AMOXIL) 500 MG Cap; 1 CAP TWICE A DAY X 10 DAYS.  Dispense: 20 Cap; Refill: 0      Discussed with her DDX, management options, and risks, benefits, and alternatives to treatment plan agreed upon.    Prior to performing diagnostic testing in office, discussed the information that can be gathered from test(s) and limitations of test(s) and patient wanted to proceed.    May use OTC Ibuprofen prn fever, headache, and/or sore throat.    Agreeable to medication prescribed.    Discussed expected course of duration, time for improvement, and to seek follow-up in Emergency Room, urgent care, or with PCP if getting worse at any time or not improving within expected time frame.

## 2019-11-14 ENCOUNTER — OFFICE VISIT (OUTPATIENT)
Dept: URGENT CARE | Facility: PHYSICIAN GROUP | Age: 34
End: 2019-11-14
Payer: COMMERCIAL

## 2019-11-14 VITALS
WEIGHT: 166 LBS | DIASTOLIC BLOOD PRESSURE: 74 MMHG | HEART RATE: 64 BPM | OXYGEN SATURATION: 99 % | TEMPERATURE: 97.6 F | RESPIRATION RATE: 14 BRPM | BODY MASS INDEX: 25.16 KG/M2 | SYSTOLIC BLOOD PRESSURE: 116 MMHG | HEIGHT: 68 IN

## 2019-11-14 DIAGNOSIS — J02.8 ACUTE PHARYNGITIS DUE TO OTHER SPECIFIED ORGANISMS: ICD-10-CM

## 2019-11-14 PROCEDURE — 99213 OFFICE O/P EST LOW 20 MIN: CPT | Performed by: INTERNAL MEDICINE

## 2019-11-14 ASSESSMENT — ENCOUNTER SYMPTOMS
ABDOMINAL PAIN: 0
NECK PAIN: 0
SHORTNESS OF BREATH: 0
COUGH: 1
TROUBLE SWALLOWING: 0
HEADACHES: 0

## 2019-11-14 NOTE — PROGRESS NOTES
"Subjective:   Karina Stern is a 34 y.o. female who presents for Pharyngitis        Pharyngitis    This is a new problem. The current episode started in the past 7 days. The problem has been gradually improving. There has been no fever. The pain is moderate. Associated symptoms include congestion and coughing. Pertinent negatives include no abdominal pain, headaches, neck pain, shortness of breath or trouble swallowing.     Review of Systems   HENT: Positive for congestion. Negative for trouble swallowing.    Respiratory: Positive for cough. Negative for shortness of breath.    Gastrointestinal: Negative for abdominal pain.   Musculoskeletal: Negative for neck pain.   Neurological: Negative for headaches.     Allergies   Allergen Reactions   • Dilaudid [Hydromorphone Hcl]       Objective:   /74 (BP Location: Left arm, Patient Position: Sitting, BP Cuff Size: Adult)   Pulse 64   Temp 36.4 °C (97.6 °F) (Temporal)   Resp 14   Ht 1.727 m (5' 8\")   Wt 75.3 kg (166 lb)   SpO2 99%   BMI 25.24 kg/m²   Physical Exam  Vitals signs reviewed.   Constitutional:       General: She is not in acute distress.     Appearance: She is well-developed.   HENT:      Head: Normocephalic and atraumatic.      Mouth/Throat:      Pharynx: Uvula midline. Posterior oropharyngeal erythema present.      Tonsils: No tonsillar abscesses.   Eyes:      Conjunctiva/sclera: Conjunctivae normal.      Pupils: Pupils are equal, round, and reactive to light.   Cardiovascular:      Rate and Rhythm: Normal rate and regular rhythm.   Pulmonary:      Effort: Pulmonary effort is normal.      Breath sounds: Normal breath sounds.   Skin:     General: Skin is warm and dry.   Neurological:      Mental Status: She is alert and oriented to person, place, and time.      Sensory: No sensory deficit.   Psychiatric:         Thought Content: Thought content normal.           Assessment/Plan:   1. Acute pharyngitis due to other specified organisms    Strep " neg  Differential diagnosis, natural history, supportive care, and indications for immediate follow-up discussed.

## 2021-11-02 ENCOUNTER — OFFICE VISIT (OUTPATIENT)
Dept: MEDICAL GROUP | Facility: PHYSICIAN GROUP | Age: 36
End: 2021-11-02
Payer: COMMERCIAL

## 2021-11-02 VITALS
HEART RATE: 91 BPM | TEMPERATURE: 97.5 F | OXYGEN SATURATION: 97 % | WEIGHT: 166 LBS | HEIGHT: 68 IN | SYSTOLIC BLOOD PRESSURE: 110 MMHG | DIASTOLIC BLOOD PRESSURE: 68 MMHG | BODY MASS INDEX: 25.16 KG/M2

## 2021-11-02 DIAGNOSIS — K29.00 ACUTE GASTRITIS WITHOUT HEMORRHAGE, UNSPECIFIED GASTRITIS TYPE: ICD-10-CM

## 2021-11-02 PROBLEM — K29.70 GASTRITIS: Status: ACTIVE | Noted: 2021-11-02

## 2021-11-02 PROCEDURE — 99213 OFFICE O/P EST LOW 20 MIN: CPT | Performed by: NURSE PRACTITIONER

## 2021-11-02 RX ORDER — FAMOTIDINE 20 MG/1
20 TABLET, FILM COATED ORAL 2 TIMES DAILY
Status: ON HOLD | COMMUNITY
End: 2021-11-07

## 2021-11-02 ASSESSMENT — PATIENT HEALTH QUESTIONNAIRE - PHQ9: CLINICAL INTERPRETATION OF PHQ2 SCORE: 0

## 2021-11-02 NOTE — ASSESSMENT & PLAN NOTE
Pleasant 36-year-old female patient presents today for follow-up of symptoms very consistent with gastritis  Symptoms started over a week ago to include nausea as well as upper abdominal pain that she described to be sharp in nature  Of note she does have history of cholecystectomy as well as formation of a stone that blocked the common bile duct in which she needed retrieval  She was seen in the emergency room, given prescription for famotidine and has been taking Mylanta  Her symptoms have mildly improved, she is adjusting her diet and following a very bland diet at the time  She denies any other systemic symptoms  Advised her to continue with the famotidine and Mylanta as needed, continue on bland diet and only advance as tolerated  Advised her to follow-up if symptoms do not continue to improve over the next week

## 2021-11-02 NOTE — PROGRESS NOTES
Chief Complaint   Patient presents with   • GI Problem     gastrititis       HISTORY OF PRESENT ILLNESS: Patient is a 36 y.o. female established patient who presents today to follow-up from urgent care, seen for gastritis    Gastritis  Pleasant 36-year-old female patient presents today for follow-up of symptoms very consistent with gastritis  Symptoms started over a week ago to include nausea as well as upper abdominal pain that she described to be sharp in nature  Of note she does have history of cholecystectomy as well as formation of a stone that blocked the common bile duct in which she needed retrieval  She was seen in the emergency room, given prescription for famotidine and has been taking Mylanta  Her symptoms have mildly improved, she is adjusting her diet and following a very bland diet at the time  She denies any other systemic symptoms  Advised her to continue with the famotidine and Mylanta as needed, continue on bland diet and only advance as tolerated  Advised her to follow-up if symptoms do not continue to improve over the next week        Patient Active Problem List    Diagnosis Date Noted   • Gastritis 11/02/2021   • Pain in both upper extremities 08/13/2018   • Vertigo 08/13/2018   • Tongue lesion 10/03/2017   • Angioedema 08/30/2017   • Raynaud phenomenon 08/30/2017       Allergies:Dilaudid [hydromorphone hcl]    Current Outpatient Medications   Medication Sig Dispense Refill   • Loratadine (CLARITIN PO) Take  by mouth.     • famotidine (PEPCID) 20 MG Tab Take 20 mg by mouth 2 times a day.     • Alum & Mag Hydroxide-Simeth (MYLANTA PO) Take  by mouth.       No current facility-administered medications for this visit.       Social History     Tobacco Use   • Smoking status: Never Smoker   • Smokeless tobacco: Never Used   Substance Use Topics   • Alcohol use: No   • Drug use: No       Family Status   Relation Name Status   • Mo  Alive   • Fa  Alive   History reviewed. No pertinent family  "history.    Review of Systems:   Constitutional: Negative for fever, chills, weight loss and malaise/fatigue.    Respiratory: Negative for cough, sputum production, shortness of breath and wheezing.    Cardiovascular: Negative for chest pain, palpitations, orthopnea and leg swelling.   Gastrointestinal: Positive per HPI  Genitourinary/Renal: Negative for dysuria, urgency and frequency.   Musculoskeletal: Negative for myalgias, back pain and joint pain.   Skin: Negative for rash and itching.   Neurological: Negative for dizziness, tingling, tremors, sensory change, focal weakness and headaches.   Endo/Heme/Allergies: Does not bruise/bleed easily.       Exam:  /68 (BP Location: Left arm, Patient Position: Sitting)   Pulse 91   Temp 36.4 °C (97.5 °F) (Temporal)   Ht 1.715 m (5' 7.5\")   Wt 75.3 kg (166 lb)   SpO2 97%   General:  Well nourished, well developed female in NAD  Skin: warm, dry, intact, no evidence of rash or concerning lesions  Head: is grossly normal.  HEENT: eyes clear, conjunctiva normal, PERRLA  Pulmonary: Clear to ausculation. Normal effort. No rales, ronchi, or wheezing.  Cardiovascular: Regular rate and rhythm without murmur. Carotid and radial pulses are intact and equal bilaterally.  Abdomen: Positive for TTP to epigastric region, otherwise soft, positive bowel sounds throughout  Musculoskeletal: no clubbing, cyanosis, or edema.  Psych/mental: no depression, anxiety, hallucinations  Neuro: alert, intact, CN 2-12 grossly intact        Assessment/Plan:      1. Acute gastritis without hemorrhage, unspecified gastritis type  Mention of advised to continue with famotidine Mylanta as prescribed, continue with a bland diet  Advised to follow-up if symptoms do not improve over the next week or if new symptoms develop         Return if symptoms worsen or fail to improve, for Follow-up.        Please note that this dictation was created using voice recognition software. I have made every " reasonable attempt to correct obvious errors, but I expect that there are errors of grammar and possibly content that I did not discover before finalizing the note.

## 2021-11-04 ENCOUNTER — HOSPITAL ENCOUNTER (INPATIENT)
Facility: MEDICAL CENTER | Age: 36
LOS: 8 days | DRG: 445 | End: 2021-11-12
Attending: EMERGENCY MEDICINE | Admitting: HOSPITALIST
Payer: COMMERCIAL

## 2021-11-04 ENCOUNTER — APPOINTMENT (OUTPATIENT)
Dept: RADIOLOGY | Facility: MEDICAL CENTER | Age: 36
DRG: 445 | End: 2021-11-04
Attending: STUDENT IN AN ORGANIZED HEALTH CARE EDUCATION/TRAINING PROGRAM
Payer: COMMERCIAL

## 2021-11-04 ENCOUNTER — APPOINTMENT (OUTPATIENT)
Dept: RADIOLOGY | Facility: MEDICAL CENTER | Age: 36
DRG: 445 | End: 2021-11-04
Attending: EMERGENCY MEDICINE
Payer: COMMERCIAL

## 2021-11-04 DIAGNOSIS — K80.50 CHOLEDOCHOLITHIASIS: ICD-10-CM

## 2021-11-04 DIAGNOSIS — R74.01 TRANSAMINITIS: ICD-10-CM

## 2021-11-04 DIAGNOSIS — K83.8 DILATION OF BILIARY TRACT: ICD-10-CM

## 2021-11-04 DIAGNOSIS — R11.2 NON-INTRACTABLE VOMITING WITH NAUSEA, UNSPECIFIED VOMITING TYPE: ICD-10-CM

## 2021-11-04 DIAGNOSIS — Z90.49 S/P CHOLECYSTECTOMY: ICD-10-CM

## 2021-11-04 DIAGNOSIS — R10.10 UPPER ABDOMINAL PAIN: ICD-10-CM

## 2021-11-04 PROBLEM — E87.6 HYPOKALEMIA: Status: ACTIVE | Noted: 2021-11-04

## 2021-11-04 PROBLEM — D72.829 LEUKOCYTOSIS: Status: ACTIVE | Noted: 2021-11-04

## 2021-11-04 LAB
ALBUMIN SERPL BCP-MCNC: 4.8 G/DL (ref 3.2–4.9)
ALBUMIN/GLOB SERPL: 1.5 G/DL
ALP SERPL-CCNC: 128 U/L (ref 30–99)
ALT SERPL-CCNC: 110 U/L (ref 2–50)
ANION GAP SERPL CALC-SCNC: 15 MMOL/L (ref 7–16)
APPEARANCE UR: CLEAR
AST SERPL-CCNC: 210 U/L (ref 12–45)
BASOPHILS # BLD AUTO: 0.2 % (ref 0–1.8)
BASOPHILS # BLD: 0.03 K/UL (ref 0–0.12)
BILIRUB SERPL-MCNC: 1 MG/DL (ref 0.1–1.5)
BILIRUB UR QL STRIP.AUTO: NEGATIVE
BLOOD CULTURE HOLD CXBCH: NORMAL
BUN SERPL-MCNC: 9 MG/DL (ref 8–22)
CALCIUM SERPL-MCNC: 9.6 MG/DL (ref 8.5–10.5)
CHLORIDE SERPL-SCNC: 100 MMOL/L (ref 96–112)
CO2 SERPL-SCNC: 24 MMOL/L (ref 20–33)
COLOR UR: YELLOW
CREAT SERPL-MCNC: 0.76 MG/DL (ref 0.5–1.4)
EOSINOPHIL # BLD AUTO: 0.01 K/UL (ref 0–0.51)
EOSINOPHIL NFR BLD: 0.1 % (ref 0–6.9)
ERYTHROCYTE [DISTWIDTH] IN BLOOD BY AUTOMATED COUNT: 42.6 FL (ref 35.9–50)
GLOBULIN SER CALC-MCNC: 3.3 G/DL (ref 1.9–3.5)
GLUCOSE SERPL-MCNC: 117 MG/DL (ref 65–99)
GLUCOSE UR STRIP.AUTO-MCNC: NEGATIVE MG/DL
HAV IGM SERPL QL IA: NORMAL
HBV CORE IGM SER QL: NORMAL
HBV SURFACE AG SER QL: NORMAL
HCG SERPL QL: NEGATIVE
HCT VFR BLD AUTO: 42.2 % (ref 37–47)
HCV AB SER QL: NORMAL
HGB BLD-MCNC: 14.1 G/DL (ref 12–16)
IMM GRANULOCYTES # BLD AUTO: 0.04 K/UL (ref 0–0.11)
IMM GRANULOCYTES NFR BLD AUTO: 0.3 % (ref 0–0.9)
KETONES UR STRIP.AUTO-MCNC: 40 MG/DL
LEUKOCYTE ESTERASE UR QL STRIP.AUTO: NEGATIVE
LIPASE SERPL-CCNC: 30 U/L (ref 11–82)
LYMPHOCYTES # BLD AUTO: 0.77 K/UL (ref 1–4.8)
LYMPHOCYTES NFR BLD: 6.3 % (ref 22–41)
MCH RBC QN AUTO: 29.7 PG (ref 27–33)
MCHC RBC AUTO-ENTMCNC: 33.4 G/DL (ref 33.6–35)
MCV RBC AUTO: 88.8 FL (ref 81.4–97.8)
MICRO URNS: ABNORMAL
MONOCYTES # BLD AUTO: 0.54 K/UL (ref 0–0.85)
MONOCYTES NFR BLD AUTO: 4.4 % (ref 0–13.4)
NEUTROPHILS # BLD AUTO: 10.78 K/UL (ref 2–7.15)
NEUTROPHILS NFR BLD: 88.7 % (ref 44–72)
NITRITE UR QL STRIP.AUTO: NEGATIVE
NRBC # BLD AUTO: 0 K/UL
NRBC BLD-RTO: 0 /100 WBC
PH UR STRIP.AUTO: 8.5 [PH] (ref 5–8)
PLATELET # BLD AUTO: 288 K/UL (ref 164–446)
PMV BLD AUTO: 11.6 FL (ref 9–12.9)
POTASSIUM SERPL-SCNC: 3.5 MMOL/L (ref 3.6–5.5)
PROT SERPL-MCNC: 8.1 G/DL (ref 6–8.2)
PROT UR QL STRIP: NEGATIVE MG/DL
RBC # BLD AUTO: 4.75 M/UL (ref 4.2–5.4)
RBC UR QL AUTO: NEGATIVE
SARS-COV+SARS-COV-2 AG RESP QL IA.RAPID: NOTDETECTED
SODIUM SERPL-SCNC: 139 MMOL/L (ref 135–145)
SP GR UR STRIP.AUTO: 1.01
SPECIMEN SOURCE: NORMAL
UROBILINOGEN UR STRIP.AUTO-MCNC: 0.2 MG/DL
WBC # BLD AUTO: 12.2 K/UL (ref 4.8–10.8)

## 2021-11-04 PROCEDURE — 83690 ASSAY OF LIPASE: CPT

## 2021-11-04 PROCEDURE — 84703 CHORIONIC GONADOTROPIN ASSAY: CPT

## 2021-11-04 PROCEDURE — 96375 TX/PRO/DX INJ NEW DRUG ADDON: CPT

## 2021-11-04 PROCEDURE — 700105 HCHG RX REV CODE 258: Performed by: STUDENT IN AN ORGANIZED HEALTH CARE EDUCATION/TRAINING PROGRAM

## 2021-11-04 PROCEDURE — 96374 THER/PROPH/DIAG INJ IV PUSH: CPT

## 2021-11-04 PROCEDURE — 74181 MRI ABDOMEN W/O CONTRAST: CPT

## 2021-11-04 PROCEDURE — 700111 HCHG RX REV CODE 636 W/ 250 OVERRIDE (IP): Performed by: EMERGENCY MEDICINE

## 2021-11-04 PROCEDURE — 770004 HCHG ROOM/CARE - ONCOLOGY PRIVATE *

## 2021-11-04 PROCEDURE — 85025 COMPLETE CBC W/AUTO DIFF WBC: CPT

## 2021-11-04 PROCEDURE — 99222 1ST HOSP IP/OBS MODERATE 55: CPT | Mod: GC | Performed by: HOSPITALIST

## 2021-11-04 PROCEDURE — A9270 NON-COVERED ITEM OR SERVICE: HCPCS | Performed by: STUDENT IN AN ORGANIZED HEALTH CARE EDUCATION/TRAINING PROGRAM

## 2021-11-04 PROCEDURE — 81003 URINALYSIS AUTO W/O SCOPE: CPT

## 2021-11-04 PROCEDURE — 96376 TX/PRO/DX INJ SAME DRUG ADON: CPT

## 2021-11-04 PROCEDURE — 99285 EMERGENCY DEPT VISIT HI MDM: CPT

## 2021-11-04 PROCEDURE — 80074 ACUTE HEPATITIS PANEL: CPT

## 2021-11-04 PROCEDURE — 87426 SARSCOV CORONAVIRUS AG IA: CPT

## 2021-11-04 PROCEDURE — 700105 HCHG RX REV CODE 258: Performed by: EMERGENCY MEDICINE

## 2021-11-04 PROCEDURE — 700117 HCHG RX CONTRAST REV CODE 255: Performed by: EMERGENCY MEDICINE

## 2021-11-04 PROCEDURE — 74177 CT ABD & PELVIS W/CONTRAST: CPT

## 2021-11-04 PROCEDURE — 700102 HCHG RX REV CODE 250 W/ 637 OVERRIDE(OP): Performed by: STUDENT IN AN ORGANIZED HEALTH CARE EDUCATION/TRAINING PROGRAM

## 2021-11-04 PROCEDURE — 700111 HCHG RX REV CODE 636 W/ 250 OVERRIDE (IP): Performed by: STUDENT IN AN ORGANIZED HEALTH CARE EDUCATION/TRAINING PROGRAM

## 2021-11-04 PROCEDURE — 80053 COMPREHEN METABOLIC PANEL: CPT

## 2021-11-04 RX ORDER — SODIUM CHLORIDE 9 MG/ML
1000 INJECTION, SOLUTION INTRAVENOUS ONCE
Status: COMPLETED | OUTPATIENT
Start: 2021-11-04 | End: 2021-11-04

## 2021-11-04 RX ORDER — OXYCODONE HYDROCHLORIDE 5 MG/1
5 TABLET ORAL EVERY 4 HOURS PRN
Status: DISCONTINUED | OUTPATIENT
Start: 2021-11-04 | End: 2021-11-12 | Stop reason: HOSPADM

## 2021-11-04 RX ORDER — PROMETHAZINE HYDROCHLORIDE 25 MG/1
12.5-25 TABLET ORAL EVERY 4 HOURS PRN
Status: DISCONTINUED | OUTPATIENT
Start: 2021-11-04 | End: 2021-11-12 | Stop reason: HOSPADM

## 2021-11-04 RX ORDER — LABETALOL HYDROCHLORIDE 5 MG/ML
10 INJECTION, SOLUTION INTRAVENOUS EVERY 4 HOURS PRN
Status: DISCONTINUED | OUTPATIENT
Start: 2021-11-04 | End: 2021-11-04

## 2021-11-04 RX ORDER — BISACODYL 10 MG
10 SUPPOSITORY, RECTAL RECTAL
Status: DISCONTINUED | OUTPATIENT
Start: 2021-11-04 | End: 2021-11-04

## 2021-11-04 RX ORDER — ONDANSETRON 2 MG/ML
4 INJECTION INTRAMUSCULAR; INTRAVENOUS ONCE
Status: COMPLETED | OUTPATIENT
Start: 2021-11-04 | End: 2021-11-04

## 2021-11-04 RX ORDER — ENALAPRILAT 1.25 MG/ML
1.25 INJECTION INTRAVENOUS EVERY 6 HOURS PRN
Status: DISCONTINUED | OUTPATIENT
Start: 2021-11-04 | End: 2021-11-04

## 2021-11-04 RX ORDER — KETOROLAC TROMETHAMINE 30 MG/ML
30 INJECTION, SOLUTION INTRAMUSCULAR; INTRAVENOUS ONCE
Status: COMPLETED | OUTPATIENT
Start: 2021-11-04 | End: 2021-11-04

## 2021-11-04 RX ORDER — ACETAMINOPHEN 500 MG
500-1000 TABLET ORAL EVERY 6 HOURS PRN
Status: ON HOLD | COMMUNITY
End: 2021-11-07

## 2021-11-04 RX ORDER — ONDANSETRON 4 MG/1
4 TABLET, ORALLY DISINTEGRATING ORAL EVERY 4 HOURS PRN
Status: DISCONTINUED | OUTPATIENT
Start: 2021-11-04 | End: 2021-11-12 | Stop reason: HOSPADM

## 2021-11-04 RX ORDER — ACETAMINOPHEN 325 MG/1
650 TABLET ORAL EVERY 6 HOURS PRN
Status: DISCONTINUED | OUTPATIENT
Start: 2021-11-04 | End: 2021-11-12 | Stop reason: HOSPADM

## 2021-11-04 RX ORDER — PROMETHAZINE HYDROCHLORIDE 25 MG/1
12.5-25 SUPPOSITORY RECTAL EVERY 4 HOURS PRN
Status: DISCONTINUED | OUTPATIENT
Start: 2021-11-04 | End: 2021-11-12 | Stop reason: HOSPADM

## 2021-11-04 RX ORDER — SODIUM CHLORIDE, SODIUM LACTATE, POTASSIUM CHLORIDE, CALCIUM CHLORIDE 600; 310; 30; 20 MG/100ML; MG/100ML; MG/100ML; MG/100ML
INJECTION, SOLUTION INTRAVENOUS CONTINUOUS
Status: DISCONTINUED | OUTPATIENT
Start: 2021-11-04 | End: 2021-11-06

## 2021-11-04 RX ORDER — ALUMINA, MAGNESIA, AND SIMETHICONE 2400; 2400; 240 MG/30ML; MG/30ML; MG/30ML
10 SUSPENSION ORAL 4 TIMES DAILY PRN
Status: ON HOLD | COMMUNITY
End: 2021-11-07

## 2021-11-04 RX ORDER — POLYETHYLENE GLYCOL 3350 17 G/17G
1 POWDER, FOR SOLUTION ORAL
Status: DISCONTINUED | OUTPATIENT
Start: 2021-11-04 | End: 2021-11-04

## 2021-11-04 RX ORDER — AMOXICILLIN 250 MG
2 CAPSULE ORAL 2 TIMES DAILY
Status: DISCONTINUED | OUTPATIENT
Start: 2021-11-04 | End: 2021-11-04

## 2021-11-04 RX ORDER — ONDANSETRON 2 MG/ML
4 INJECTION INTRAMUSCULAR; INTRAVENOUS EVERY 4 HOURS PRN
Status: DISCONTINUED | OUTPATIENT
Start: 2021-11-04 | End: 2021-11-12 | Stop reason: HOSPADM

## 2021-11-04 RX ORDER — AMOXICILLIN 250 MG
2 CAPSULE ORAL 2 TIMES DAILY PRN
Status: DISCONTINUED | OUTPATIENT
Start: 2021-11-04 | End: 2021-11-06

## 2021-11-04 RX ORDER — CLONIDINE HYDROCHLORIDE 0.1 MG/1
0.1 TABLET ORAL EVERY 6 HOURS PRN
Status: DISCONTINUED | OUTPATIENT
Start: 2021-11-04 | End: 2021-11-04

## 2021-11-04 RX ORDER — PROCHLORPERAZINE EDISYLATE 5 MG/ML
5-10 INJECTION INTRAMUSCULAR; INTRAVENOUS EVERY 4 HOURS PRN
Status: DISCONTINUED | OUTPATIENT
Start: 2021-11-04 | End: 2021-11-06

## 2021-11-04 RX ADMIN — KETOROLAC TROMETHAMINE 30 MG: 30 INJECTION, SOLUTION INTRAMUSCULAR at 09:10

## 2021-11-04 RX ADMIN — SODIUM CHLORIDE 1000 ML: 9 INJECTION, SOLUTION INTRAVENOUS at 07:11

## 2021-11-04 RX ADMIN — IOHEXOL 100 ML: 350 INJECTION, SOLUTION INTRAVENOUS at 08:45

## 2021-11-04 RX ADMIN — FENTANYL CITRATE 50 MCG: 50 INJECTION INTRAMUSCULAR; INTRAVENOUS at 07:08

## 2021-11-04 RX ADMIN — ONDANSETRON 4 MG: 2 INJECTION INTRAMUSCULAR; INTRAVENOUS at 21:48

## 2021-11-04 RX ADMIN — SODIUM CHLORIDE, POTASSIUM CHLORIDE, SODIUM LACTATE AND CALCIUM CHLORIDE: 600; 310; 30; 20 INJECTION, SOLUTION INTRAVENOUS at 21:00

## 2021-11-04 RX ADMIN — FENTANYL CITRATE 50 MCG: 50 INJECTION INTRAMUSCULAR; INTRAVENOUS at 09:09

## 2021-11-04 RX ADMIN — OXYCODONE 5 MG: 5 TABLET ORAL at 22:52

## 2021-11-04 RX ADMIN — OXYCODONE 5 MG: 5 TABLET ORAL at 13:30

## 2021-11-04 RX ADMIN — OXYCODONE 5 MG: 5 TABLET ORAL at 17:23

## 2021-11-04 RX ADMIN — ACETAMINOPHEN 650 MG: 325 TABLET ORAL at 21:49

## 2021-11-04 RX ADMIN — LIDOCAINE HYDROCHLORIDE 30 ML: 20 SOLUTION OROPHARYNGEAL at 11:58

## 2021-11-04 RX ADMIN — SODIUM CHLORIDE, POTASSIUM CHLORIDE, SODIUM LACTATE AND CALCIUM CHLORIDE: 600; 310; 30; 20 INJECTION, SOLUTION INTRAVENOUS at 11:58

## 2021-11-04 RX ADMIN — ONDANSETRON 4 MG: 2 INJECTION INTRAMUSCULAR; INTRAVENOUS at 07:05

## 2021-11-04 ASSESSMENT — LIFESTYLE VARIABLES
EVER HAD A DRINK FIRST THING IN THE MORNING TO STEADY YOUR NERVES TO GET RID OF A HANGOVER: NO
HAVE PEOPLE ANNOYED YOU BY CRITICIZING YOUR DRINKING: NO
HAVE YOU EVER FELT YOU SHOULD CUT DOWN ON YOUR DRINKING: NO
ON A TYPICAL DAY WHEN YOU DRINK ALCOHOL HOW MANY DRINKS DO YOU HAVE: 0
TOTAL SCORE: 0
TOTAL SCORE: 0
CONSUMPTION TOTAL: NEGATIVE
AVERAGE NUMBER OF DAYS PER WEEK YOU HAVE A DRINK CONTAINING ALCOHOL: 0
EVER FELT BAD OR GUILTY ABOUT YOUR DRINKING: NO
ALCOHOL_USE: NO
TOTAL SCORE: 0
HOW MANY TIMES IN THE PAST YEAR HAVE YOU HAD 5 OR MORE DRINKS IN A DAY: 0
SUBSTANCE_ABUSE: 0

## 2021-11-04 ASSESSMENT — COGNITIVE AND FUNCTIONAL STATUS - GENERAL
SUGGESTED CMS G CODE MODIFIER MOBILITY: CH
SUGGESTED CMS G CODE MODIFIER DAILY ACTIVITY: CH
DAILY ACTIVITIY SCORE: 24
MOBILITY SCORE: 24

## 2021-11-04 ASSESSMENT — ENCOUNTER SYMPTOMS
BLOOD IN STOOL: 0
CHILLS: 0
DIARRHEA: 1
TINGLING: 0
COUGH: 0
HEADACHES: 0
HALLUCINATIONS: 0
FEVER: 0
ABDOMINAL PAIN: 1
DIZZINESS: 0
NECK PAIN: 0
TREMORS: 0
PALPITATIONS: 0
SPUTUM PRODUCTION: 0
WEAKNESS: 0
EYE PAIN: 0
BLURRED VISION: 0
CONSTIPATION: 0
DOUBLE VISION: 0
WHEEZING: 0
VOMITING: 1
MYALGIAS: 0
NAUSEA: 1
BACK PAIN: 0
DEPRESSION: 0
NERVOUS/ANXIOUS: 1
INSOMNIA: 0
SHORTNESS OF BREATH: 1
SORE THROAT: 0
ORTHOPNEA: 0
SENSORY CHANGE: 0

## 2021-11-04 ASSESSMENT — PATIENT HEALTH QUESTIONNAIRE - PHQ9
2. FEELING DOWN, DEPRESSED, IRRITABLE, OR HOPELESS: NOT AT ALL
1. LITTLE INTEREST OR PLEASURE IN DOING THINGS: NOT AT ALL
SUM OF ALL RESPONSES TO PHQ9 QUESTIONS 1 AND 2: 0

## 2021-11-04 ASSESSMENT — PAIN DESCRIPTION - PAIN TYPE
TYPE: ACUTE PAIN

## 2021-11-04 ASSESSMENT — PAIN DESCRIPTION - DESCRIPTORS: DESCRIPTORS: DULL

## 2021-11-04 ASSESSMENT — FIBROSIS 4 INDEX: FIB4 SCORE: 2.5

## 2021-11-04 NOTE — ASSESSMENT & PLAN NOTE
Leukocytosis, likely reactive to ongoing intrahepatic process, does not appear septic, low concerns for cholangitis at this time    - Clinically improved and no signs of infection at this time  - Will do workup for cholangitis or infection if indicated

## 2021-11-04 NOTE — H&P
History & Physical Note    Date of Admission: 11/4/2021  Admission Status: Inpatient  [facility] Team: [facility]  Attending: Juan Luis Madrigal M.D.   Senior Resident: Dr. Rudi Galvan MD  Contact Number: [phone]    Chief Complaint: Abdominal pain, vomiting     History of Present Illness (HPI):   Karina is a 36 y.o. female, hx of asymptomatic MVP, cholelithiasis (cholecystectomy 2011); admitted for acute 2wk duration of abd pain with hyperacute nausea/vomiting; found to have intrahepatic ductal dilation concerning for stricture/obstruction.     Has had abd pain for the past 2 weeks, dull and achy initially, went to urgent care who diagnosed her as having gastritis and given trial of pepcid, mylanta; these medications did not alter her abd pain. Her abd pain improved somewhat on a bland diet, but continued to persist. In the past 24hours, her pain has now increased, radiates to back and L shoulder, rated initially as 7/10 (now 4/10 post analgesics), with associated nausea and vomiting (6x, nonbloody) that started last night. She also has had loose watery stool x1 without hematochezia/melena.    Review of Systems:   Review of Systems   Constitutional: Negative for chills and fever.   HENT: Negative for congestion, nosebleeds and sore throat.    Eyes: Negative for blurred vision, double vision and pain.   Respiratory: Positive for shortness of breath. Negative for cough, sputum production and wheezing.    Cardiovascular: Negative for chest pain, palpitations, orthopnea and leg swelling.   Gastrointestinal: Positive for abdominal pain, diarrhea, nausea and vomiting. Negative for blood in stool, constipation and melena.   Genitourinary: Negative for dysuria, frequency, hematuria and urgency.   Musculoskeletal: Negative for back pain, joint pain, myalgias and neck pain.   Neurological: Negative for dizziness, tingling, tremors, sensory change, weakness and headaches.   Psychiatric/Behavioral: Negative for depression,  hallucinations, substance abuse and suicidal ideas. The patient is nervous/anxious. The patient does not have insomnia.          Past Medical History:   Past Medical History was reviewed with patient.  Asymptomatic mitral valve prolapse   has no past medical history on file.    Past Surgical History: Past Surgical History was reviewed with patient.   has a past surgical history that includes primary c section; cholecystectomy; and ercp.    Medications: Medications have been reviewed with patient.  Prior to Admission Medications   Prescriptions Last Dose Informant Patient Reported? Taking?   acetaminophen (TYLENOL) 500 MG Tab 11/4/2021 at 0300 Patient Yes Yes   Sig: Take 500-1,000 mg by mouth every 6 hours as needed for Mild Pain.   famotidine (PEPCID) 20 MG Tab 11/3/2021 at 1930 Patient Yes No   Sig: Take 20 mg by mouth 2 times a day.   mag hydrox-al hydrox-simeth (MAALOX PLUS ES OR MYLANTA DS) 400-400-40 MG/5ML Suspension 11/4/2021 at 0100 Patient Yes Yes   Sig: Take 10 mL by mouth 4 times a day as needed.      Facility-Administered Medications: None        Allergies: Allergies have been reviewed with patient.  Allergies   Allergen Reactions   • Dilaudid [Hydromorphone Hcl]        Family History:   No family hx of early onset cancer, MI  family history is not on file.     Social History:   Tobacco: never   Alcohol: never   Recreational drugs (illegal and prescription):  never   Employment:   Activity Level: active walker, , walking/hikes   Living situation:  Home in [place], lives with significant other, children  Recent travel:  none  Primary Care Provider: reviewed TODD Jara  Other (stressors, spirituality, exposures):  None reported  Physical Exam:   Vitals:  Temp:  [36.6 °C (97.8 °F)] 36.6 °C (97.8 °F)  Pulse:  [60-77] 68  Resp:  [14-19] 18  BP: (109-137)/(58-93) 110/67  SpO2:  [94 %-100 %] 94 %    Physical Exam  Vitals and nursing note reviewed.   Constitutional:        General: She is not in acute distress.     Appearance: She is not ill-appearing or diaphoretic.      Comments: uncomfortable   HENT:      Head: Normocephalic and atraumatic.      Mouth/Throat:      Mouth: Mucous membranes are moist.      Pharynx: Oropharynx is clear.   Eyes:      Extraocular Movements: Extraocular movements intact.      Pupils: Pupils are equal, round, and reactive to light.   Cardiovascular:      Rate and Rhythm: Normal rate and regular rhythm.      Heart sounds: No murmur heard.   No friction rub. No gallop.    Pulmonary:      Effort: Pulmonary effort is normal. No respiratory distress.      Breath sounds: Normal breath sounds. No wheezing, rhonchi or rales.   Abdominal:      Comments: (post analgesic): soft, nondistended, mild epigastric and RUQ tenderness, no rebound/guarding   Musculoskeletal:      Comments: Shoulder/elbow flexion/extension 5/5 bilaterally and symmetric; knee/hip flexion/extension 5/5 bilaterally and symmetric   Skin:     General: Skin is warm and dry.   Neurological:      General: No focal deficit present.      Mental Status: She is alert and oriented to person, place, and time.      Cranial Nerves: No cranial nerve deficit.      Sensory: No sensory deficit.      Motor: No weakness.   Psychiatric:         Mood and Affect: Mood normal.         Behavior: Behavior normal.         Thought Content: Thought content normal.         Judgment: Judgment normal.         Labs:   Recent Labs     11/04/21  0658   WBC 12.2*   RBC 4.75   HEMOGLOBIN 14.1   HEMATOCRIT 42.2   MCV 88.8   MCH 29.7   RDW 42.6   PLATELETCT 288   MPV 11.6   NEUTSPOLYS 88.70*   LYMPHOCYTES 6.30*   MONOCYTES 4.40   EOSINOPHILS 0.10   BASOPHILS 0.20     Lab Results   Component Value Date/Time    SODIUM 139 11/04/2021 06:58 AM    POTASSIUM 3.5 (L) 11/04/2021 06:58 AM    CHLORIDE 100 11/04/2021 06:58 AM    CO2 24 11/04/2021 06:58 AM    GLUCOSE 117 (H) 11/04/2021 06:58 AM    BUN 9 11/04/2021 06:58 AM    CREATININE 0.76  11/04/2021 06:58 AM          EKG: not performed, SR on tele monitor    Imaging:   CT abd/pelvis 11/4:   1.  No acute inflammatory process in the abdomen or pelvis.  2.  Severe left intrahepatic biliary dilatation, etiology uncertain. Consider MRCP to evaluate for a possible stricture near the confluence of the right and left hepatic ducts.  3.  Mildly dilated CBD. Prior cholecystectomy.  MRCP 11/4:  pending    Previous Data Review: reviewed    Problem Representation:     Karina is a 36 y.o. female, hx of asymptomatic MVP; admitted for acute 2wk duration of abd pain with hyperacute nausea/vomiting; found to have intrahepatic ductal dilation concerning for stricture/obstruction.     * Intrahepatic bile duct dilation  Assessment & Plan  Acute abd pain, hx of cholecystectomy in 2011 for cholelithiasis. CT abd revealed L intrahepatic duct dilation, concerning for stricture. Liver enzymes consistent with acute intrahepatic etiology. Has had multiple episodes of emesis (x6, nonbloody). Very unlikely to be obstruction due to cholelithiasis.  -MRCP ordered, pending  -GI consultation for potential ERCP/intervention (stent?) call after MRCP, if indicated  -Zofran IV PRN  -NPO sips w/meds, ice chips  -Tylenol PRN  -Oxycodone 5mg PO q4h/PRN  -LR @125ml/hr  -GI cocktail x1, symptomatic relief      Leukocytosis  Assessment & Plan  Leukocytosis, likely reactive to ongoing intrahepatic process, does not appear septic, low concerns for cholangitis at this time.  -CBC repeat  -consider BCx if fever arises  -Zosyn would be preferred ABX if concerns arise    Hypokalemia  Assessment & Plan  Mild hypokalemia, likely related to acute emesis episodes.  -Zofran IV PRN  -LR @ 125ml/hr  -repeat CMP in AM

## 2021-11-04 NOTE — ED PROVIDER NOTES
"ED Provider Note    CHIEF COMPLAINT  Chief Complaint   Patient presents with   • Abdominal Pain   • Nausea/Vomiting/Diarrhea       HPI  Karina Stern is a 36 y.o. female who presents to the emergency department through triage for upper abdominal pain.  Patient describes pain for 1 week.  It subsides at times but returns.  Worse for the last 24 hours.  No vomiting, multiple episodes overnight.  Few bowel movements, loose, nonbloody.  No fever or chills.  Decreased appetite, not tolerating oral food and fluids since last night.    Patient has history of similar symptoms prior to cholecystectomy 10 years ago, then had retained stone requiring intervention following the surgery.  No symptoms since that time.    Patient was seen at [facility] 1 week ago, told she had \"gastritis\" after labs.  No imaging done.    LMP this week, denies pregnancy, not sexually active.  No dysuria, hematuria or frequency.  No abnormal vaginal bleeding or discharge.  No sick contacts or travel.    REVIEW OF SYSTEMS  See HPI for further details. All other systems are negative.     PAST MEDICAL HISTORY   Raynaud's,?  Lupus, mitral valve prolapse secondary to scarlet fever.    SOCIAL HISTORY  Social History     Tobacco Use   • Smoking status: Never Smoker   • Smokeless tobacco: Never Used   Substance and Sexual Activity   • Alcohol use: No   • Drug use: No   • Sexual activity: Not on file       SURGICAL HISTORY   has a past surgical history that includes primary c section; cholecystectomy; and ercp.    CURRENT MEDICATIONS  Home Medications     Reviewed by Aicha Shahid R.N. (Registered Nurse) on 11/04/21 at 0540  Med List Status: Partial   Medication Last Dose Status   Alum & Mag Hydroxide-Simeth (MYLANTA PO)  Active   famotidine (PEPCID) 20 MG Tab  Active   Loratadine (CLARITIN PO)  Active            Claritin, none this week.    ALLERGIES  Allergies   Allergen Reactions   • Dilaudid [Hydromorphone Hcl]    \"I stop breathing\" but " "tolerates fentanyl and morphine    PHYSICAL EXAM  VITAL SIGNS: /58   Pulse 70   Temp 36.6 °C (97.8 °F) (Temporal)   Resp 16   Ht 1.702 m (5' 7\")   Wt 74.8 kg (165 lb)   LMP 10/31/2021   SpO2 99%   BMI 25.84 kg/m²   Pulse ox interpretation: I interpret this pulse ox as normal.  Constitutional: Alert in no apparent distress.  HENT: Normocephalic, atraumatic. Bilateral external ears normal, Nose normal.  Dry mucous membranes.    Eyes: Pupils are equal and reactive, Conjunctiva normal.   Neck: Normal range of motion, Supple  Lymphatic: No lymphadenopathy noted.   Cardiovascular: Regular rate and rhythm, no murmurs. Distal pulses intact.  Thorax & Lungs: Normal breath sounds.  No wheezing/rales/ronchi. No increased work of breathing  Abdomen: Soft, non-distended.  Tender palpation across upper abdomen, greatest in the epigastric region without rebound, guarding or peritonitis.  No palpable pulsatile mass.  Skin: Warm, Dry, No erythema, No rash.   Musculoskeletal: Good range of motion in all major joints.   Neurologic: Alert and orient x4.  Speech clear and cohesive.  Moves 4 extremities spontaneously.  Psychiatric: Affect normal, Judgment normal, Mood normal.       DIAGNOSTIC STUDIES / PROCEDURES    LABS  Results for orders placed or performed during the hospital encounter of 11/04/21   CBC WITH DIFFERENTIAL   Result Value Ref Range    WBC 12.2 (H) 4.8 - 10.8 K/uL    RBC 4.75 4.20 - 5.40 M/uL    Hemoglobin 14.1 12.0 - 16.0 g/dL    Hematocrit 42.2 37.0 - 47.0 %    MCV 88.8 81.4 - 97.8 fL    MCH 29.7 27.0 - 33.0 pg    MCHC 33.4 (L) 33.6 - 35.0 g/dL    RDW 42.6 35.9 - 50.0 fL    Platelet Count 288 164 - 446 K/uL    MPV 11.6 9.0 - 12.9 fL    Neutrophils-Polys 88.70 (H) 44.00 - 72.00 %    Lymphocytes 6.30 (L) 22.00 - 41.00 %    Monocytes 4.40 0.00 - 13.40 %    Eosinophils 0.10 0.00 - 6.90 %    Basophils 0.20 0.00 - 1.80 %    Immature Granulocytes 0.30 0.00 - 0.90 %    Nucleated RBC 0.00 /100 WBC    Neutrophils " (Absolute) 10.78 (H) 2.00 - 7.15 K/uL    Lymphs (Absolute) 0.77 (L) 1.00 - 4.80 K/uL    Monos (Absolute) 0.54 0.00 - 0.85 K/uL    Eos (Absolute) 0.01 0.00 - 0.51 K/uL    Baso (Absolute) 0.03 0.00 - 0.12 K/uL    Immature Granulocytes (abs) 0.04 0.00 - 0.11 K/uL    NRBC (Absolute) 0.00 K/uL   COMP METABOLIC PANEL   Result Value Ref Range    Sodium 139 135 - 145 mmol/L    Potassium 3.5 (L) 3.6 - 5.5 mmol/L    Chloride 100 96 - 112 mmol/L    Co2 24 20 - 33 mmol/L    Anion Gap 15.0 7.0 - 16.0    Glucose 117 (H) 65 - 99 mg/dL    Bun 9 8 - 22 mg/dL    Creatinine 0.76 0.50 - 1.40 mg/dL    Calcium 9.6 8.5 - 10.5 mg/dL    AST(SGOT) 210 (H) 12 - 45 U/L    ALT(SGPT) 110 (H) 2 - 50 U/L    Alkaline Phosphatase 128 (H) 30 - 99 U/L    Total Bilirubin 1.0 0.1 - 1.5 mg/dL    Albumin 4.8 3.2 - 4.9 g/dL    Total Protein 8.1 6.0 - 8.2 g/dL    Globulin 3.3 1.9 - 3.5 g/dL    A-G Ratio 1.5 g/dL   LIPASE   Result Value Ref Range    Lipase 30 11 - 82 U/L   HCG QUAL SERUM   Result Value Ref Range    Beta-Hcg Qualitative Serum Negative Negative   ESTIMATED GFR   Result Value Ref Range    GFR If African American >60 >60 mL/min/1.73 m 2    GFR If Non African American >60 >60 mL/min/1.73 m 2   URINALYSIS,CULTURE IF INDICATED   Result Value Ref Range    Color Yellow     Character Clear     Specific Gravity 1.013 <1.035    Ph 8.5 (A) 5.0 - 8.0    Glucose Negative Negative mg/dL    Ketones 40 (A) Negative mg/dL    Protein Negative Negative mg/dL    Bilirubin Negative Negative    Urobilinogen, Urine 0.2 Negative    Nitrite Negative Negative    Leukocyte Esterase Negative Negative    Occult Blood Negative Negative    Micro Urine Req see below      RADIOLOGY  CT-ABDOMEN-PELVIS WITH   Final Result      1.  No acute inflammatory process in the abdomen or pelvis.   2.  Severe left intrahepatic biliary dilatation, etiology uncertain. Consider MRCP to evaluate for a possible stricture near the confluence of the right and left hepatic ducts.   3.  Mildly  "dilated CBD. Prior cholecystectomy.          COURSE & MEDICAL DECISION MAKING  Nursing notes and vital signs were reviewed. (See chart for details)  The patients records were reviewed, history was obtained from the patient;     Evaluation is concerned for biliary obstruction or stricture.  Mild leukocytosis, WBC 12.2, leftward shift without bandemia.  No electrolyte derangement.  However, patient does have a transaminitis, but normal total bilirubin.  Normal lipase.  CT suggests \"severe left intrahepatic biliary dilatation, etiology uncertain.  Consider MRCP to evaluate for possible stricture near the confluence of the right left hepatic ducts.  Mildly dilated CBD.\"  Urinalysis unremarkable, pregnancy negative.  Abdomen is tender across the upper abdomen, greatest in the epigastric region, but without peritonitis.  She is hemodynamically stable without fever, tachycardia or hypotension.  Pain initially controlled with fentanyl, but requiring repeat doses.  Will add Toradol.  Patient will be hospitalized for further evaluation, MRCP, GI consultation if indicated.  Rapid COVID is been added anticipating procedure.  Patient remains n.p.o.    9:29 AM [facility] IM Resident is aware of the patient and agreeable to consultation.    FINAL IMPRESSION  (R10.10) Upper abdominal pain  (R11.2) Non-intractable vomiting with nausea, unspecified vomiting type  (R74.01) Transaminitis  (K83.8) Dilation of biliary tract  (Z90.49) S/P cholecystectomy      Electronically signed by: Vianey Islas D.O., 11/4/2021 7:08 AM      This dictation was created using voice recognition software. The accuracy of the dictation is limited to the abilities of the software. I expect there may be some errors of grammar and possibly content. The nursing notes were reviewed and certain aspects of this information were incorporated into this note.        "

## 2021-11-04 NOTE — ED TRIAGE NOTES
Karina Stern  36 y.o.  Chief Complaint   Patient presents with   • Abdominal Pain   • Nausea/Vomiting/Diarrhea     Patient to triage for above, sx x1 week. States seen at [facility] dx w/ gastritis. Told to f/u if sx worsened, states pain to upper abdomen worse today & unable to keep anything down.     Pt vomiting green bile in triage. A&Ox4, GCS 15. Protocol orders placed.    Triage process explained to patient, apologized for wait time, and returned to [facility].  Pt informed to notify staff of any change in condition.

## 2021-11-04 NOTE — ASSESSMENT & PLAN NOTE
Acute abd pain, hx of cholecystectomy in 2011 for cholelithiasis. CT abd revealed L intrahepatic duct dilation, concerning for stricture. Liver enzymes consistent with acute intrahepatic etiology. Has had multiple episodes of emesis (x6, nonbloody). Very unlikely to be obstruction due to cholelithiasis. MRCP consistent with cholelithiasis. GI consulted and due to improved abdominal pain and downtrending AST (60 from 210) and ALT (72 from 110) they will defer ERCP as stone seems to have passed and will need follow up outpatient.    Plan:  - Full liquid diet advance as tolerated  - Attempt to transition to PO meds for nausea and pain control  - repeat CMP shows resolved transaminitis  - Due to beaded imaging pattern seen on MRCP, ordered DICK, anti-smooth muscle, anti mitochondrial  - Ferritin and TIBC both lower limit of normal  - Planning for discharge once able to tolerate diet  - Follow up with the GI consultants in 2-4 weeks

## 2021-11-05 PROBLEM — R11.0 NAUSEA: Status: ACTIVE | Noted: 2021-11-05

## 2021-11-05 PROBLEM — K80.50 CHOLEDOCHOLITHIASIS: Status: ACTIVE | Noted: 2021-11-04

## 2021-11-05 LAB
ALBUMIN SERPL BCP-MCNC: 4 G/DL (ref 3.2–4.9)
ALBUMIN/GLOB SERPL: 2.5 G/DL
ALP SERPL-CCNC: 81 U/L (ref 30–99)
ALT SERPL-CCNC: 72 U/L (ref 2–50)
ANION GAP SERPL CALC-SCNC: 8 MMOL/L (ref 7–16)
AST SERPL-CCNC: 60 U/L (ref 12–45)
BILIRUB SERPL-MCNC: 0.5 MG/DL (ref 0.1–1.5)
BUN SERPL-MCNC: 8 MG/DL (ref 8–22)
CALCIUM SERPL-MCNC: 8.4 MG/DL (ref 8.5–10.5)
CHLORIDE SERPL-SCNC: 107 MMOL/L (ref 96–112)
CO2 SERPL-SCNC: 24 MMOL/L (ref 20–33)
CREAT SERPL-MCNC: 0.68 MG/DL (ref 0.5–1.4)
FERRITIN SERPL-MCNC: 29.9 NG/ML (ref 10–291)
GLOBULIN SER CALC-MCNC: 1.6 G/DL (ref 1.9–3.5)
GLUCOSE SERPL-MCNC: 82 MG/DL (ref 65–99)
IRON SATN MFR SERPL: 40 % (ref 15–55)
IRON SERPL-MCNC: 99 UG/DL (ref 40–170)
POTASSIUM SERPL-SCNC: 3.8 MMOL/L (ref 3.6–5.5)
PROT SERPL-MCNC: 5.6 G/DL (ref 6–8.2)
SODIUM SERPL-SCNC: 139 MMOL/L (ref 135–145)
TIBC SERPL-MCNC: 249 UG/DL (ref 250–450)
UIBC SERPL-MCNC: 150 UG/DL (ref 110–370)

## 2021-11-05 PROCEDURE — 700111 HCHG RX REV CODE 636 W/ 250 OVERRIDE (IP)

## 2021-11-05 PROCEDURE — A9270 NON-COVERED ITEM OR SERVICE: HCPCS | Performed by: STUDENT IN AN ORGANIZED HEALTH CARE EDUCATION/TRAINING PROGRAM

## 2021-11-05 PROCEDURE — 86235 NUCLEAR ANTIGEN ANTIBODY: CPT

## 2021-11-05 PROCEDURE — 83550 IRON BINDING TEST: CPT

## 2021-11-05 PROCEDURE — 82728 ASSAY OF FERRITIN: CPT

## 2021-11-05 PROCEDURE — 99232 SBSQ HOSP IP/OBS MODERATE 35: CPT | Mod: GC | Performed by: HOSPITALIST

## 2021-11-05 PROCEDURE — 700105 HCHG RX REV CODE 258: Performed by: STUDENT IN AN ORGANIZED HEALTH CARE EDUCATION/TRAINING PROGRAM

## 2021-11-05 PROCEDURE — 86039 ANTINUCLEAR ANTIBODIES (ANA): CPT

## 2021-11-05 PROCEDURE — 83516 IMMUNOASSAY NONANTIBODY: CPT

## 2021-11-05 PROCEDURE — 700102 HCHG RX REV CODE 250 W/ 637 OVERRIDE(OP): Performed by: STUDENT IN AN ORGANIZED HEALTH CARE EDUCATION/TRAINING PROGRAM

## 2021-11-05 PROCEDURE — 83540 ASSAY OF IRON: CPT

## 2021-11-05 PROCEDURE — 82787 IGG 1 2 3 OR 4 EACH: CPT

## 2021-11-05 PROCEDURE — 36415 COLL VENOUS BLD VENIPUNCTURE: CPT

## 2021-11-05 PROCEDURE — 770004 HCHG ROOM/CARE - ONCOLOGY PRIVATE *

## 2021-11-05 PROCEDURE — 86038 ANTINUCLEAR ANTIBODIES: CPT

## 2021-11-05 PROCEDURE — 80053 COMPREHEN METABOLIC PANEL: CPT

## 2021-11-05 PROCEDURE — 700111 HCHG RX REV CODE 636 W/ 250 OVERRIDE (IP): Performed by: STUDENT IN AN ORGANIZED HEALTH CARE EDUCATION/TRAINING PROGRAM

## 2021-11-05 PROCEDURE — 86225 DNA ANTIBODY NATIVE: CPT

## 2021-11-05 RX ADMIN — ONDANSETRON 4 MG: 2 INJECTION INTRAMUSCULAR; INTRAVENOUS at 07:32

## 2021-11-05 RX ADMIN — SODIUM CHLORIDE, POTASSIUM CHLORIDE, SODIUM LACTATE AND CALCIUM CHLORIDE: 600; 310; 30; 20 INJECTION, SOLUTION INTRAVENOUS at 05:23

## 2021-11-05 RX ADMIN — SODIUM CHLORIDE, POTASSIUM CHLORIDE, SODIUM LACTATE AND CALCIUM CHLORIDE: 600; 310; 30; 20 INJECTION, SOLUTION INTRAVENOUS at 13:29

## 2021-11-05 RX ADMIN — OXYCODONE 5 MG: 5 TABLET ORAL at 03:36

## 2021-11-05 RX ADMIN — ONDANSETRON 4 MG: 2 INJECTION INTRAMUSCULAR; INTRAVENOUS at 19:34

## 2021-11-05 RX ADMIN — SODIUM CHLORIDE, POTASSIUM CHLORIDE, SODIUM LACTATE AND CALCIUM CHLORIDE: 600; 310; 30; 20 INJECTION, SOLUTION INTRAVENOUS at 19:37

## 2021-11-05 RX ADMIN — OXYCODONE 5 MG: 5 TABLET ORAL at 07:32

## 2021-11-05 ASSESSMENT — ENCOUNTER SYMPTOMS
NAUSEA: 1
PALPITATIONS: 0
COUGH: 0
RESPIRATORY NEGATIVE: 1
PSYCHIATRIC NEGATIVE: 1
MUSCULOSKELETAL NEGATIVE: 1
VOMITING: 0
HEARTBURN: 0
EYES NEGATIVE: 1
SORE THROAT: 0
FEVER: 0
ABDOMINAL PAIN: 1
DIARRHEA: 0
WHEEZING: 0
DOUBLE VISION: 0
CONSTIPATION: 0
CARDIOVASCULAR NEGATIVE: 1
MYALGIAS: 0
NEUROLOGICAL NEGATIVE: 1
CHILLS: 0
DIZZINESS: 0
SHORTNESS OF BREATH: 0

## 2021-11-05 ASSESSMENT — PAIN DESCRIPTION - PAIN TYPE
TYPE: ACUTE PAIN

## 2021-11-05 NOTE — CONSULTS
Gastroenterology Consult Note:    TODD Cobos  Date & Time note created:    11/5/2021   10:48 AM     Referring MD:  Dr. Juan Luis Madrigal  Patient ID:  Name:             Karina Stern   YOB: 1985  Age:                 36 y.o.  female   MRN:               [id number]                                                             Reason for Consult:      1. Elevated LFT's  2. RUQ pain    History of Present Illness:    This is a 37 yo female PMH angioedema (unknown etiology), mitral valve prolapse, Raynauds who was admitted to the hospital 11/4/21 with a 1 week history of intermittent RUQ/epigastric abdominal pain radiating to back, N/V. First episode was last week, seen at [place] [facility] and was diagnosed with gastritis. Discharged home on a bland diet with some improvement in symptoms but had another episode of similar pain yesterday. Abnormal ER labs revealed WBC 12.2. Potassium 3.5. Glucose 117. AST: 210. ALT: 110. ALP: 128. TB: 1.0.     CT scan abdomen/pelvis: Severe left intrahepatic biliary dilatation, etiology uncertain. Consider MRCP to evaluate for a possible stricture near the confluence of the right and left hepatic ducts. Mildly dilated CBD. Prior cholecystectomy.    MRCP:   1.  Intrahepatic and extrahepatic ductal dilatation. Intrahepatic ducts have a beaded appearance, primarily in the lateral segment of the left hepatic lobe with hepatoliths. Findings may represent primary sclerosing cholangitis or IgG4 related   cholangitis. No pancreatic abnormalities to suggest autoimmune pancreatitis   2.  Common bile duct dilatation with choledocholithiasis.   3.  Pancreatic divisum is incidentally noted.     Currently, abdominal pain has resolved. No nausea. Complaints of fatigue for the past week but no pruritus. LFT's trending down: AST: 60. ALT: 72. ALP: 81. TB: 0.5. Hepatitis panel: non reactive.     She denies history of inflammatory bowel disease or autoimmune disease. Mother  diagnosed with myasthenia gravis and fibromyalgia.    Review of Systems:      Review of Systems   Constitutional: Positive for malaise/fatigue.   HENT: Negative.    Eyes: Negative.    Respiratory: Negative.    Cardiovascular: Negative.    Gastrointestinal: Positive for abdominal pain and nausea.   Genitourinary: Negative.    Musculoskeletal: Negative.    Skin: Negative.    Neurological: Negative.    Psychiatric/Behavioral: Negative.              Physical Exam:  Vitals/ General Appearance:   Weight/BMI: Body mass index is 25.72 kg/m².    Vitals:    11/04/21 2013 11/05/21 0449 11/05/21 0730 11/05/21 0800   BP: 109/48 101/43  103/48   Pulse: 64 65  (!) 57   Resp: 17 16 16 18   Temp: 36.6 °C (97.9 °F) 36.8 °C (98.3 °F)  36.8 °C (98.2 °F)   TempSrc: Temporal Temporal  Temporal   SpO2: 98% 93%  100%   Weight:       Height:         Oxygen Therapy:  Pulse Oximetry: 100 %, O2 (LPM): 0, O2 Delivery Device: None - Room Air    Physical Exam  Vitals and nursing note reviewed.   Constitutional:       Appearance: Normal appearance.   HENT:      Head: Normocephalic and atraumatic.      Right Ear: Tympanic membrane normal.      Left Ear: Tympanic membrane normal.      Mouth/Throat:      Mouth: Mucous membranes are dry.   Eyes:      General: No scleral icterus.     Extraocular Movements: Extraocular movements intact.      Pupils: Pupils are equal, round, and reactive to light.   Cardiovascular:      Rate and Rhythm: Normal rate and regular rhythm.      Pulses: Normal pulses.      Heart sounds: Normal heart sounds.   Pulmonary:      Effort: Pulmonary effort is normal.      Breath sounds: Normal breath sounds.   Abdominal:      General: Abdomen is flat. Bowel sounds are normal.      Palpations: Abdomen is soft.      Tenderness: There is no abdominal tenderness.   Musculoskeletal:         General: Normal range of motion.      Cervical back: Normal range of motion and neck supple.   Skin:     General: Skin is warm and dry.      Capillary  Refill: Capillary refill takes less than 2 seconds.      Coloration: Skin is not jaundiced.   Neurological:      General: No focal deficit present.      Mental Status: She is alert and oriented to person, place, and time.   Psychiatric:         Mood and Affect: Mood normal.         Behavior: Behavior normal.         Thought Content: Thought content normal.         Judgment: Judgment normal.         Past Medical History:   History reviewed. No pertinent past medical history.    Past Surgical History:  Past Surgical History:   Procedure Laterality Date   • CHOLECYSTECTOMY     • ERCP     • PRIMARY C SECTION         Hospital Medications:    Current Facility-Administered Medications:   •  lactated ringers infusion, , Intravenous, Continuous, Rudi Galvan M.D., Last Rate: 125 mL/hr at 11/05/21 0523, New Bag at 11/05/21 0523  •  acetaminophen (Tylenol) tablet 650 mg, 650 mg, Oral, Q6HRS PRN, Rudi Galvan M.D., 650 mg at 11/04/21 2149  •  ondansetron (ZOFRAN) syringe/vial injection 4 mg, 4 mg, Intravenous, Q4HRS PRN, Rudi Galvan M.D., 4 mg at 11/05/21 0732  •  ondansetron (ZOFRAN ODT) dispertab 4 mg, 4 mg, Oral, Q4HRS PRN, Rudi Galvan M.D.  •  promethazine (PHENERGAN) tablet 12.5-25 mg, 12.5-25 mg, Oral, Q4HRS PRN, Rudi Galvan M.D.  •  promethazine (PHENERGAN) suppository 12.5-25 mg, 12.5-25 mg, Rectal, Q4HRS PRN, Rudi Galvan M.D.  •  prochlorperazine (COMPAZINE) injection 5-10 mg, 5-10 mg, Intravenous, Q4HRS PRN, Rudi Galvan M.D.  •  oxyCODONE immediate-release (ROXICODONE) tablet 5 mg, 5 mg, Oral, Q4HRS PRN, Rudi Galvan M.D., 5 mg at 11/05/21 0732  •  senna-docusate (PERICOLACE or SENOKOT S) 8.6-50 MG per tablet 2 Tablet, 2 Tablet, Oral, BID PRN **AND** [DISCONTINUED] polyethylene glycol/lytes (MIRALAX) PACKET 1 Packet, 1 Packet, Oral, QDAY PRN **AND** [DISCONTINUED] magnesium hydroxide (MILK OF MAGNESIA) suspension 30 mL, 30 mL, Oral, QDAY PRN **AND**  [DISCONTINUED] bisacodyl (DULCOLAX) suppository 10 mg, 10 mg, Rectal, QDAY PRN, Rudi Galvan M.D.    Current Outpatient Medications:  Current Facility-Administered Medications   Medication Dose Route Frequency Provider Last Rate Last Admin   • lactated ringers infusion   Intravenous Continuous Rudi Galvan M.D. 125 mL/hr at 11/05/21 0523 New Bag at 11/05/21 0523   • acetaminophen (Tylenol) tablet 650 mg  650 mg Oral Q6HRS PRN Rudi Galvan M.D.   650 mg at 11/04/21 2149   • ondansetron (ZOFRAN) syringe/vial injection 4 mg  4 mg Intravenous Q4HRS PRN Rudi Galvan M.D.   4 mg at 11/05/21 0732   • ondansetron (ZOFRAN ODT) dispertab 4 mg  4 mg Oral Q4HRS PRN Rudi Galvan M.D.       • promethazine (PHENERGAN) tablet 12.5-25 mg  12.5-25 mg Oral Q4HRS PRN Rudi Galvan M.D.       • promethazine (PHENERGAN) suppository 12.5-25 mg  12.5-25 mg Rectal Q4HRS PRN Rudi Galvan M.D.       • prochlorperazine (COMPAZINE) injection 5-10 mg  5-10 mg Intravenous Q4HRS PRN Rudi Galvan M.D.       • oxyCODONE immediate-release (ROXICODONE) tablet 5 mg  5 mg Oral Q4HRS PRN Rudi Galvan M.D.   5 mg at 11/05/21 0732   • senna-docusate (PERICOLACE or SENOKOT S) 8.6-50 MG per tablet 2 Tablet  2 Tablet Oral BID PRN Juan Luis Madrigal M.D.           Medication Allergy:  Allergies   Allergen Reactions   • Dilaudid [Hydromorphone Hcl]      Patient doesn't specifically recall event, thinks she possibly stopped breathing. Has tolerated oxycodone, hydrocodone, and morphine historically without issues.        Family History:  History reviewed. No pertinent family history.    Social History:  Social History     Socioeconomic History   • Marital status: Single     Spouse name: Not on file   • Number of children: Not on file   • Years of education: Not on file   • Highest education level: Not on file   Occupational History   • Not on file   Tobacco Use   • Smoking status: Never Smoker   • Smokeless  tobacco: Never Used   Substance and Sexual Activity   • Alcohol use: No   • Drug use: No   • Sexual activity: Not on file   Other Topics Concern   • Not on file   Social History Narrative   • Not on file     Social Determinants of Health     Financial Resource Strain:    • Difficulty of Paying Living Expenses:    Food Insecurity:    • Worried About Running Out of Food in the Last Year:    • Ran Out of Food in the Last Year:    Transportation Needs:    • Lack of Transportation (Medical):    • Lack of Transportation (Non-Medical):    Physical Activity:    • Days of Exercise per Week:    • Minutes of Exercise per Session:    Stress:    • Feeling of Stress :    Social Connections:    • Frequency of Communication with Friends and Family:    • Frequency of Social Gatherings with Friends and Family:    • Attends Synagogue Services:    • Active Member of Clubs or Organizations:    • Attends Club or Organization Meetings:    • Marital Status:    Intimate Partner Violence:    • Fear of Current or Ex-Partner:    • Emotionally Abused:    • Physically Abused:    • Sexually Abused:          MDM (Data Review):     Records reviewed and summarized in current documentation    Lab Data Review:  Recent Results (from the past 24 hour(s))   Comp Metabolic Panel (CMP)    Collection Time: 11/05/21  1:18 AM   Result Value Ref Range    Sodium 139 135 - 145 mmol/L    Potassium 3.8 3.6 - 5.5 mmol/L    Chloride 107 96 - 112 mmol/L    Co2 24 20 - 33 mmol/L    Anion Gap 8.0 7.0 - 16.0    Glucose 82 65 - 99 mg/dL    Bun 8 8 - 22 mg/dL    Creatinine 0.68 0.50 - 1.40 mg/dL    Calcium 8.4 (L) 8.5 - 10.5 mg/dL    AST(SGOT) 60 (H) 12 - 45 U/L    ALT(SGPT) 72 (H) 2 - 50 U/L    Alkaline Phosphatase 81 30 - 99 U/L    Total Bilirubin 0.5 0.1 - 1.5 mg/dL    Albumin 4.0 3.2 - 4.9 g/dL    Total Protein 5.6 (L) 6.0 - 8.2 g/dL    Globulin 1.6 (L) 1.9 - 3.5 g/dL    A-G Ratio 2.5 g/dL   ESTIMATED GFR    Collection Time: 11/05/21  1:18 AM   Result Value Ref Range     GFR If African American >60 >60 mL/min/1.73 m 2    GFR If Non African American >60 >60 mL/min/1.73 m 2       Imaging/Procedures Review:      [id number]   Final Result      1.  Intrahepatic and extrahepatic ductal dilatation. Intrahepatic ducts have a beaded appearance, primarily in the lateral segment of the left hepatic lobe with hepatoliths. Findings may represent primary sclerosing cholangitis or IgG4 related    cholangitis. No pancreatic abnormalities to suggest autoimmune pancreatitis      2.  Common bile duct dilatation with choledocholithiasis.      3.  Pancreatic divisum is incidentally noted.               CT-ABDOMEN-PELVIS WITH   Final Result      1.  No acute inflammatory process in the abdomen or pelvis.   2.  Severe left intrahepatic biliary dilatation, etiology uncertain. Consider MRCP to evaluate for a possible stricture near the confluence of the right and left hepatic ducts.   3.  Mildly dilated CBD. Prior cholecystectomy.           MDM (Assessment and Plan):     Patient Active Problem List    Diagnosis Date Noted   • Intrahepatic bile duct dilation 11/04/2021   • Hypokalemia 11/04/2021   • Leukocytosis 11/04/2021   • Gastritis 11/02/2021   • Pain in both upper extremities 08/13/2018   • Vertigo 08/13/2018   • Tongue lesion 10/03/2017   • Angioedema 08/30/2017   • Raynaud phenomenon 08/30/2017     This is a very pleasant 35 yo female who was admitted to the hospital 11/4/21 with a 1 week history of intermittent RUQ/epigastric abdominal pain radiating to midback with N/V. Evaluated last week at [facility] and was diagnosed with gastritis. Started on a bland diet with some improvement in symptoms until yesterday when she had another episode of severe upper abdominal pain, N/V. Initially, LFT elevated. CT scan revealed severe left intrahepatic biliary dilatation. Mildly dilated CBD. MRCP revealed choledocholithiasis,  Intrahepatic and extrahepatic ductal dilatation. Intrahepatic ducts have a  beaded appearance, primarily in the lateral segment of the left hepatic lobe with hepatolith's. Currently, patient is asymptomatic. Complaints of fatigue. LFT's trending down.    ASSESSMENT:  1. RUQ/epigastric abdominal pain  2. N/V  3. Elevated LFT's    4. Choledocolithiaisis: likely stone has passed since LFT's trending down and no abdominal pain.  5. 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    PLAN:  1. Due to LfT's trending down and abdominal pain resolved, will defer ERCP; outpatient CMP check  2. LABS:  DICK, AMA, ASMA, IgG4  3. Recommend full liquid diet then ADAT  4. IV fluids, antiemetics, pain medication per primary team  5. Dr. Orta recommended patient to research [facility] on [facility] patient resources    Recommend outpatient GI appointment in the next 2-4 weeks for further evaluation and discuss labs.    GI WILL SIGN OFF PLEASE CALL IF ANY QUESTIONS OR CONCERNS.         Thank your for the opportunity to assist in the care of your patient.  Please call for any questions or concerns.    CRUZ Cobos.      I saw and examined the patient. I agree with our PA-PAO's note with changes as per my note and/or addendum.    My total time spent caring for the patient on the day of the encounter was 56 minutes.   This does not include time spent on separately billable procedures/tests.     Mynor Orta MD, [facility]  [facility]

## 2021-11-05 NOTE — PROGRESS NOTES
Skin Check- Ears intact, Back intact, Arms intact, legs intact, Buttocks intact,  bilateral heels intact

## 2021-11-05 NOTE — PROGRESS NOTES
Daily Progress Note:     Date of Service: 11/5/2021  Primary Team: [facility]   Attending: Juan Luis Madrigal M.D.   Senior Resident: Dr. Roberto Mai  Intern: Dr. Stefano Jacobsen  Contact:  [phone]    Chief Complaint:   Abdominal pain    Patient ID/Summary:  Pt is a 37 yo M with PMH of cholecystectomy, angioedema of unknown etiology, raynauds phenomenon, and mitral valve prolapse who presented to the ED 11/4/21 for 1 week history of intermittent RUQ/abdominal pain that radiates to the back. Admitted for cholelithiasis. GI consulted and will defer ERCP due to improving transaminitis and clinical symptoms. Will advance diet as tolerated, transition to PO meds and continue to monitor.    Interval Hx:  - MRCP positive for choledocholithiasis without pancreatitis, however has beaded appearance in ducts that could suggest primary sclerosis cholangitis or autoimmune  - Improved abdominal pain and transaminitis  - Clear liquids and advance diet as tolerated  - Ordered DICK, Anti SM, anti mitochondrial, and IgG4    Subjective:  Pt reports improved abdominal pain from yesterday with some nausea still present. However the medications have been helping her with the symptoms. She denies any vomiting, constipation/diarrhea, fever, or confusion. She states the sensation is similar to her prior bile stone history.    Consultants/Specialty:  Gastroenterology    Review of Systems:   Review of Systems   Constitutional: Negative for chills and fever.   HENT: Negative for sore throat.    Eyes: Negative for double vision.   Respiratory: Negative for cough, shortness of breath and wheezing.    Cardiovascular: Negative for chest pain, palpitations and leg swelling.   Gastrointestinal: Positive for nausea. Negative for constipation, diarrhea, heartburn and vomiting.   Genitourinary: Negative for dysuria and urgency.   Musculoskeletal: Negative for myalgias.   Skin: Negative for rash.   Neurological: Negative for dizziness.        Objective Data:   Physical Exam:   Vitals:   Temp:  [36.6 °C (97.9 °F)-36.8 °C (98.3 °F)] 36.8 °C (98.2 °F)  Pulse:  [57-65] 57  Resp:  [16-20] 18  BP: ()/(43-55) 103/48  SpO2:  [93 %-100 %] 100 %     Physical Exam  Vitals reviewed.   Constitutional:       General: She is not in acute distress.  HENT:      Head: Normocephalic and atraumatic.      Nose: Nose normal.      Mouth/Throat:      Mouth: Mucous membranes are moist.   Eyes:      General: No scleral icterus.  Cardiovascular:      Rate and Rhythm: Normal rate and regular rhythm.      Heart sounds: No murmur heard.   No friction rub. No gallop.    Pulmonary:      Effort: Pulmonary effort is normal. No respiratory distress.      Breath sounds: No wheezing, rhonchi or rales.   Abdominal:      General: Abdomen is flat. Bowel sounds are normal. There is no distension.      Palpations: Abdomen is soft.      Tenderness: There is abdominal tenderness in the right upper quadrant and epigastric area.   Musculoskeletal:      Cervical back: Neck supple.      Right lower leg: No tenderness. No edema.      Left lower leg: No tenderness. No edema.   Skin:     General: Skin is warm.   Neurological:      General: No focal deficit present.      Mental Status: She is alert.   Psychiatric:         Behavior: Behavior is cooperative.           Labs:   HEMATOLOGY/ ONCOLOGY/ID:            Recent Labs     11/04/21 0658   WBC 12.2*   RBC 4.75   HEMOGLOBIN 14.1   HEMATOCRIT 42.2   MCV 88.8   MCH 29.7   RDW 42.6   PLATELETCT 288   MPV 11.6   NEUTSPOLYS 88.70*   LYMPHOCYTES 6.30*   MONOCYTES 4.40   EOSINOPHILS 0.10   BASOPHILS 0.20     Lab Results   Component Value Date    FERRITIN 29.9 11/05/2021    IRON 99 11/05/2021    TOTIRONBC 249 (L) 11/05/2021       RENAL:        Estimated GFR/CRCL = Estimated Creatinine Clearance: 120.6 mL/min (by C-G formula based on SCr of 0.68 mg/dL).  Recent Labs     11/04/21 0658 11/05/21  0118   SODIUM 139 139   POTASSIUM 3.5* 3.8   CHLORIDE 100  107   CO2 24 24   GLUCOSE 117* 82   BUN 9 8   CREATININE 0.76 0.68   CALCIUM 9.6 8.4*   ALBUMIN 4.8 4.0       GASTROINTESTINAL/ HEPATIC:          Recent Labs     21  0658 21  0118   ALTSGPT 110* 72*   ASTSGOT 210* 60*   ALKPHOSPHAT 128* 81   TBILIRUBIN 1.0 0.5   LIPASE 30  --    ALBUMIN 4.8 4.0   GLOBULIN 3.3 1.6*     No results found for: AMMONIA    ENDOCRINE:              Recent Labs     21  0658 21  0118   GLUCOSE 117* 82     No results found for: HBA1C, TSH, FREET4, FREET3, CORTISOL     Imagin/4/21 MRCP  - Intrahepatic and extrahepatic ductal dilatation with intrahepatic ducts having beaded appearance primarily in the lateral segment of left hepatic lobe with hepatoliths. Findings may represent cholangitis. No pancreatic abnormalities to suggest autoimmune pancreatitis    21 CT abd/pelvis with contrast  - No acute inflammatory process in abdomen of pelvis  - Severe left intrahepatic biliary dilatation  - Mildly dilated CBD, prior cholecystectomy    Assessment/Plan:    * Choledocholithiasis  Assessment & Plan  Acute abd pain, hx of cholecystectomy in  for cholelithiasis. CT abd revealed L intrahepatic duct dilation, concerning for stricture. Liver enzymes consistent with acute intrahepatic etiology. Has had multiple episodes of emesis (x6, nonbloody). Very unlikely to be obstruction due to cholelithiasis. MRCP consistent with cholelithiasis. GI consulted and due to improved abdominal pain and downtrending AST (60 from 210) and ALT (72 from 110) they will defer ERCP and follow up outpatient.    Plan:  - Clear liquid diet, advance as tolerated  - Attempt to transition to PO meds for nausea and pain control  - Follow up with GI outpatient  - Due to beaded imaging pattern seen on MRCP, ordered DICK, anti-smooth muscle, anti mitochondrial, and ferritin/TIBC    Nausea  Assessment & Plan  Nausea associated with choledocholithiasis    - Zofran oral and IV available, PO first line  and IV if unable to tolerate  - Compazine PRN  - Phenergan PRN    Leukocytosis  Assessment & Plan  Leukocytosis, likely reactive to ongoing intrahepatic process, does not appear septic, low concerns for cholangitis at this time    - Clinically improved and no signs of infection at this time  - Will do workup for cholangitis or infection if indicated    Hypokalemia- resolved  Assessment & Plan  Mild hypokalemia, likely related to acute emesis episodes          Code status: Full  Tubes/lines/drains: PIV  Diet: Clear liquid advance as tolerated  DVT PPX: None  GI PPX: Zofran, compazine, and phenergan  Disposition: Once patient is able to tolerate nausea or pain with oral medications. Possible discharge tomorrow.

## 2021-11-05 NOTE — CARE PLAN
Problem: Pain - Standard  Goal: Alleviation of pain or a reduction in pain to the patient’s comfort goal  Outcome: Progressing     Problem: Knowledge Deficit - Standard  Goal: Patient and family/care givers will demonstrate understanding of plan of care, disease process/condition, diagnostic tests and medications  Outcome: Progressing   The patient is stable    Shift Goals  Clinical Goals: Pain and nausea control  Patient Goals: Pain and nausea control    Progress made toward(s) clinical / shift goals: pain control    Patient is not progressing towards the following goals:None

## 2021-11-05 NOTE — ASSESSMENT & PLAN NOTE
Nausea associated with choledocholithiasis    - Zofran oral and IV available, PO first line and IV if unable to tolerate  - Phenergan PRN for breakthrough

## 2021-11-05 NOTE — DIETARY
"Nutrition services: Day 1 of admit.  Karina Stern is a 36 y.o. female admitted for acute 2wk duration of abd pain with hyperacute nausea/vomiting; found to have intrahepatic ductal dilation concerning for stricture/obstruction.   History includes: asymptomatic MVP  Patient seen for poor PO intake and weight loss prior to admission.  Malnutrition screening tool score of 2.  Patient reported she thought she might have lost 2-5# and has had poor PO intake for about the last week, but no severe weight loss.  Discussed meal planning for improved overall digestion.    Assessment:  Height: 170.2 cm (5' 7\")  Weight: 74.5 kg (164 lb 3.9 oz)  Body mass index is 25.72 kg/m².   Diet/Intake: was NPO, now started on full liquids.    Evaluation:   1. IVF of lactated ringers at 125 ml/hr per MAR  2. Per MRI impression: \"Common bile duct dilatation with choledocholithiasis\"     Malnutrition Risk: not noted at this time.    Recommendations/Inteventions/Plan:     1. Advance diet as tolerated.  Full liquid diet can be high in rich dairy foods; she may benefit from GI soft.  2. Encourage intake of meals as tolerated  3. Document intake of all PO as % taken in ADL's to provide interdisciplinary communication across all shifts.   4. Monitor weight.  5. Nutrition rep will continue to see patient for ongoing meal and snack preferences.     RD following.      "

## 2021-11-06 LAB
ALBUMIN SERPL BCP-MCNC: 4 G/DL (ref 3.2–4.9)
ALBUMIN/GLOB SERPL: 1.6 G/DL
ALP SERPL-CCNC: 71 U/L (ref 30–99)
ALT SERPL-CCNC: 38 U/L (ref 2–50)
ANION GAP SERPL CALC-SCNC: 11 MMOL/L (ref 7–16)
AST SERPL-CCNC: 18 U/L (ref 12–45)
BILIRUB SERPL-MCNC: 0.3 MG/DL (ref 0.1–1.5)
BUN SERPL-MCNC: 3 MG/DL (ref 8–22)
CALCIUM SERPL-MCNC: 8.9 MG/DL (ref 8.5–10.5)
CHLORIDE SERPL-SCNC: 105 MMOL/L (ref 96–112)
CO2 SERPL-SCNC: 24 MMOL/L (ref 20–33)
CREAT SERPL-MCNC: 0.65 MG/DL (ref 0.5–1.4)
ETHANOL BLD-MCNC: <10.1 MG/DL (ref 0–10)
GLOBULIN SER CALC-MCNC: 2.5 G/DL (ref 1.9–3.5)
GLUCOSE SERPL-MCNC: 91 MG/DL (ref 65–99)
POTASSIUM SERPL-SCNC: 4 MMOL/L (ref 3.6–5.5)
PROT SERPL-MCNC: 6.5 G/DL (ref 6–8.2)
SODIUM SERPL-SCNC: 140 MMOL/L (ref 135–145)

## 2021-11-06 PROCEDURE — 83516 IMMUNOASSAY NONANTIBODY: CPT

## 2021-11-06 PROCEDURE — A9270 NON-COVERED ITEM OR SERVICE: HCPCS | Performed by: STUDENT IN AN ORGANIZED HEALTH CARE EDUCATION/TRAINING PROGRAM

## 2021-11-06 PROCEDURE — 99232 SBSQ HOSP IP/OBS MODERATE 35: CPT | Mod: GC | Performed by: HOSPITALIST

## 2021-11-06 PROCEDURE — 82077 ASSAY SPEC XCP UR&BREATH IA: CPT

## 2021-11-06 PROCEDURE — 700102 HCHG RX REV CODE 250 W/ 637 OVERRIDE(OP): Performed by: STUDENT IN AN ORGANIZED HEALTH CARE EDUCATION/TRAINING PROGRAM

## 2021-11-06 PROCEDURE — 80053 COMPREHEN METABOLIC PANEL: CPT

## 2021-11-06 PROCEDURE — 82784 ASSAY IGA/IGD/IGG/IGM EACH: CPT

## 2021-11-06 PROCEDURE — 770004 HCHG ROOM/CARE - ONCOLOGY PRIVATE *

## 2021-11-06 PROCEDURE — 82787 IGG 1 2 3 OR 4 EACH: CPT | Mod: 91

## 2021-11-06 PROCEDURE — 36415 COLL VENOUS BLD VENIPUNCTURE: CPT

## 2021-11-06 RX ORDER — POLYETHYLENE GLYCOL 3350 17 G/17G
1 POWDER, FOR SOLUTION ORAL
Status: DISCONTINUED | OUTPATIENT
Start: 2021-11-06 | End: 2021-11-06

## 2021-11-06 RX ORDER — AMOXICILLIN 250 MG
2 CAPSULE ORAL 2 TIMES DAILY
Status: DISCONTINUED | OUTPATIENT
Start: 2021-11-06 | End: 2021-11-12 | Stop reason: HOSPADM

## 2021-11-06 RX ORDER — AMOXICILLIN 250 MG
2 CAPSULE ORAL 2 TIMES DAILY
Status: DISCONTINUED | OUTPATIENT
Start: 2021-11-06 | End: 2021-11-06

## 2021-11-06 RX ORDER — BISACODYL 10 MG
10 SUPPOSITORY, RECTAL RECTAL
Status: DISCONTINUED | OUTPATIENT
Start: 2021-11-06 | End: 2021-11-08 | Stop reason: DRUGHIGH

## 2021-11-06 RX ORDER — BISACODYL 10 MG
10 SUPPOSITORY, RECTAL RECTAL
Status: DISCONTINUED | OUTPATIENT
Start: 2021-11-06 | End: 2021-11-06

## 2021-11-06 RX ORDER — POLYETHYLENE GLYCOL 3350 17 G/17G
1 POWDER, FOR SOLUTION ORAL DAILY
Status: DISCONTINUED | OUTPATIENT
Start: 2021-11-06 | End: 2021-11-12 | Stop reason: HOSPADM

## 2021-11-06 RX ADMIN — OXYCODONE 5 MG: 5 TABLET ORAL at 14:10

## 2021-11-06 ASSESSMENT — ENCOUNTER SYMPTOMS
PALPITATIONS: 0
ABDOMINAL PAIN: 1
CONSTIPATION: 0
FEVER: 0
DIZZINESS: 0
DOUBLE VISION: 0
HEARTBURN: 0
VOMITING: 0
MYALGIAS: 0
COUGH: 0
SORE THROAT: 0
NAUSEA: 1
WHEEZING: 0
DIARRHEA: 0
CHILLS: 0
SHORTNESS OF BREATH: 0

## 2021-11-06 ASSESSMENT — FIBROSIS 4 INDEX: FIB4 SCORE: .8838834764831844053

## 2021-11-06 ASSESSMENT — PAIN DESCRIPTION - PAIN TYPE
TYPE: ACUTE PAIN

## 2021-11-06 NOTE — PROGRESS NOTES
Daily Progress Note:     Date of Service: 11/6/2021  Primary Team: [facility]   Attending: Juan Luis Madrigal M.D.   Senior Resident: Dr. Roberto Mai  Intern: Dr. Stefano Jacobsen  Contact:  [phone]    Chief Complaint:   Abdominal pain    Patient ID/Summary:  Pt is a 35 yo M with PMH of cholecystectomy, angioedema of unknown etiology, raynauds phenomenon, and mitral valve prolapse who presented to the ED 11/4/21 for 1 week history of intermittent RUQ/abdominal pain that radiates to the back. Admitted for cholelithiasis. GI consulted and will defer ERCP due to improving transaminitis and clinical symptoms. Will advance diet as tolerated, transition to PO meds and continue to monitor.    Interval Hx:  - Clinically patient still having abdominal pain and tenderness in RUQ  - Will repeat CMP for AST/ALT reassessment  - Continue oxycodone and zofran for symptom control    Subjective:  Pt reports abdominal pain similar to how she was before admission with better controlled nausea. Denies vomiting, hematochezia, hematemesis, or melena. States she was able to get up and walk around the room with tolerance. Clear and full liquid diet still causing distress and pain only present over the RUQ and epigastric area after eating.     Consultants/Specialty:  Gastroenterology    Review of Systems:   Review of Systems   Constitutional: Negative for chills and fever.   HENT: Negative for sore throat.    Eyes: Negative for double vision.   Respiratory: Negative for cough, shortness of breath and wheezing.    Cardiovascular: Negative for chest pain, palpitations and leg swelling.   Gastrointestinal: Positive for abdominal pain and nausea. Negative for constipation, diarrhea, heartburn and vomiting.   Genitourinary: Negative for dysuria and urgency.   Musculoskeletal: Negative for myalgias.   Skin: Negative for rash.   Neurological: Negative for dizziness.       Objective Data:   Physical Exam:   Vitals:   Temp:  [36.5 °C (97.7  °F)-37.4 °C (99.4 °F)] 36.6 °C (97.8 °F)  Pulse:  [64-74] 64  Resp:  [16-18] 17  BP: (101-127)/(50-70) 101/58  SpO2:  [94 %-98 %] 97 %     Physical Exam  Vitals reviewed.   Constitutional:       General: She is not in acute distress.  HENT:      Head: Normocephalic and atraumatic.      Nose: Nose normal.      Mouth/Throat:      Mouth: Mucous membranes are moist.   Eyes:      General: No scleral icterus.  Cardiovascular:      Rate and Rhythm: Normal rate and regular rhythm.      Heart sounds: No murmur heard.   No friction rub. No gallop.    Pulmonary:      Effort: Pulmonary effort is normal. No respiratory distress.      Breath sounds: No wheezing, rhonchi or rales.   Abdominal:      General: Abdomen is flat. Bowel sounds are normal. There is no distension.      Palpations: Abdomen is soft.      Tenderness: There is abdominal tenderness in the right upper quadrant and epigastric area.   Musculoskeletal:      Cervical back: Neck supple.      Right lower leg: No tenderness. No edema.      Left lower leg: No tenderness. No edema.   Skin:     General: Skin is warm.   Neurological:      General: No focal deficit present.      Mental Status: She is alert.   Psychiatric:         Behavior: Behavior is cooperative.           Labs:   HEMATOLOGY/ ONCOLOGY/ID:            Recent Labs     11/04/21 0658   WBC 12.2*   RBC 4.75   HEMOGLOBIN 14.1   HEMATOCRIT 42.2   MCV 88.8   MCH 29.7   RDW 42.6   PLATELETCT 288   MPV 11.6   NEUTSPOLYS 88.70*   LYMPHOCYTES 6.30*   MONOCYTES 4.40   EOSINOPHILS 0.10   BASOPHILS 0.20     Lab Results   Component Value Date    FERRITIN 29.9 11/05/2021    IRON 99 11/05/2021    TOTIRONBC 249 (L) 11/05/2021       RENAL:        Estimated GFR/CRCL = Estimated Creatinine Clearance: 121.5 mL/min (by C-G formula based on SCr of 0.68 mg/dL).  Recent Labs     11/04/21 0658 11/05/21  0118   SODIUM 139 139   POTASSIUM 3.5* 3.8   CHLORIDE 100 107   CO2 24 24   GLUCOSE 117* 82   BUN 9 8   CREATININE 0.76 0.68    CALCIUM 9.6 8.4*   ALBUMIN 4.8 4.0       GASTROINTESTINAL/ HEPATIC:          Recent Labs     21  0658 21  0118   ALTSGPT 110* 72*   ASTSGOT 210* 60*   ALKPHOSPHAT 128* 81   TBILIRUBIN 1.0 0.5   LIPASE 30  --    ALBUMIN 4.8 4.0   GLOBULIN 3.3 1.6*     No results found for: AMMONIA    ENDOCRINE:              Recent Labs     21  0658 21  0118   GLUCOSE 117* 82     No results found for: HBA1C, TSH, FREET4, FREET3, CORTISOL     Imagin/4/21 MRCP  - Intrahepatic and extrahepatic ductal dilatation with intrahepatic ducts having beaded appearance primarily in the lateral segment of left hepatic lobe with hepatoliths. Findings may represent cholangitis. No pancreatic abnormalities to suggest autoimmune pancreatitis    21 CT abd/pelvis with contrast  - No acute inflammatory process in abdomen of pelvis  - Severe left intrahepatic biliary dilatation  - Mildly dilated CBD, prior cholecystectomy    Assessment/Plan:    * Choledocholithiasis  Assessment & Plan  Acute abd pain, hx of cholecystectomy in  for cholelithiasis. CT abd revealed L intrahepatic duct dilation, concerning for stricture. Liver enzymes consistent with acute intrahepatic etiology. Has had multiple episodes of emesis (x6, nonbloody). Very unlikely to be obstruction due to cholelithiasis. MRCP consistent with cholelithiasis. GI consulted and due to improved abdominal pain and downtrending AST (60 from 210) and ALT (72 from 110) they will defer ERCP and follow up outpatient.    Plan:  - Clear liquid diet, advance as tolerated  - Attempt to transition to PO meds for nausea and pain control  - Still having persistent abdominal pain despite rest, will repeat CMP to reassess transaminitis  - Due to beaded imaging pattern seen on MRCP, ordered DICK, anti-smooth muscle, anti mitochondrial  - Ferritin and TIBC both lower limit of normal    Nausea  Assessment & Plan  Nausea associated with choledocholithiasis    - Zofran oral and IV  available, PO first line and IV if unable to tolerate  - Phenergan PRN for breakthrough    Leukocytosis  Assessment & Plan  Leukocytosis, likely reactive to ongoing intrahepatic process, does not appear septic, low concerns for cholangitis at this time    - Clinically improved and no signs of infection at this time  - Will do workup for cholangitis or infection if indicated    Hypokalemia- resolved  Assessment & Plan  Mild hypokalemia, likely related to acute emesis episodes          Code status: Full  Tubes/lines/drains: PIV  Diet: Clear liquid advance as tolerated  DVT PPX: None  GI PPX: Zofran and phenergan  Disposition: Once patient is able to tolerate nausea or pain with oral medications. TBD

## 2021-11-07 LAB
MITOCHONDRIA M2 IGG SER-ACNC: 2.4 UNITS (ref 0–24.9)
NUCLEAR IGG SER QL IA: DETECTED
SMA IGG SER-ACNC: 4 UNITS (ref 0–19)
TEST NAME 95000: NORMAL

## 2021-11-07 PROCEDURE — 700102 HCHG RX REV CODE 250 W/ 637 OVERRIDE(OP)

## 2021-11-07 PROCEDURE — 700111 HCHG RX REV CODE 636 W/ 250 OVERRIDE (IP)

## 2021-11-07 PROCEDURE — A9270 NON-COVERED ITEM OR SERVICE: HCPCS

## 2021-11-07 PROCEDURE — 700102 HCHG RX REV CODE 250 W/ 637 OVERRIDE(OP): Performed by: STUDENT IN AN ORGANIZED HEALTH CARE EDUCATION/TRAINING PROGRAM

## 2021-11-07 PROCEDURE — 770004 HCHG ROOM/CARE - ONCOLOGY PRIVATE *

## 2021-11-07 PROCEDURE — 99232 SBSQ HOSP IP/OBS MODERATE 35: CPT | Mod: GC | Performed by: HOSPITALIST

## 2021-11-07 PROCEDURE — A9270 NON-COVERED ITEM OR SERVICE: HCPCS | Performed by: STUDENT IN AN ORGANIZED HEALTH CARE EDUCATION/TRAINING PROGRAM

## 2021-11-07 RX ORDER — POLYETHYLENE GLYCOL 3350 17 G/17G
17 POWDER, FOR SOLUTION ORAL DAILY
Qty: 7 EACH | Refills: 0 | Status: SHIPPED | OUTPATIENT
Start: 2021-11-08 | End: 2021-11-12

## 2021-11-07 RX ORDER — ACETAMINOPHEN 325 MG/1
650 TABLET ORAL EVERY 6 HOURS PRN
Qty: 30 TABLET | Refills: 0 | Status: SHIPPED | OUTPATIENT
Start: 2021-11-07 | End: 2021-11-12

## 2021-11-07 RX ORDER — OXYCODONE HYDROCHLORIDE 5 MG/1
5 TABLET ORAL EVERY 8 HOURS PRN
Qty: 9 TABLET | Refills: 0 | Status: SHIPPED | OUTPATIENT
Start: 2021-11-07 | End: 2021-11-12

## 2021-11-07 RX ADMIN — ONDANSETRON 4 MG: 4 TABLET, ORALLY DISINTEGRATING ORAL at 20:16

## 2021-11-07 RX ADMIN — OXYCODONE 5 MG: 5 TABLET ORAL at 12:05

## 2021-11-07 RX ADMIN — OXYCODONE 5 MG: 5 TABLET ORAL at 20:15

## 2021-11-07 RX ADMIN — PROMETHAZINE HYDROCHLORIDE 25 MG: 25 TABLET ORAL at 22:33

## 2021-11-07 RX ADMIN — SENNOSIDES AND DOCUSATE SODIUM 2 TABLET: 50; 8.6 TABLET ORAL at 09:19

## 2021-11-07 RX ADMIN — ONDANSETRON 4 MG: 4 TABLET, ORALLY DISINTEGRATING ORAL at 10:57

## 2021-11-07 RX ADMIN — OXYCODONE 5 MG: 5 TABLET ORAL at 04:31

## 2021-11-07 RX ADMIN — PROMETHAZINE HYDROCHLORIDE 25 MG: 25 TABLET ORAL at 18:06

## 2021-11-07 RX ADMIN — OXYCODONE 5 MG: 5 TABLET ORAL at 16:24

## 2021-11-07 RX ADMIN — ONDANSETRON 4 MG: 4 TABLET, ORALLY DISINTEGRATING ORAL at 16:25

## 2021-11-07 RX ADMIN — SENNOSIDES AND DOCUSATE SODIUM 2 TABLET: 50; 8.6 TABLET ORAL at 18:10

## 2021-11-07 ASSESSMENT — PAIN DESCRIPTION - PAIN TYPE
TYPE: ACUTE PAIN

## 2021-11-07 ASSESSMENT — ENCOUNTER SYMPTOMS
SORE THROAT: 0
PALPITATIONS: 0
ABDOMINAL PAIN: 1
CHILLS: 0
NAUSEA: 0
HEARTBURN: 0
DOUBLE VISION: 0
MYALGIAS: 0
FEVER: 0
SHORTNESS OF BREATH: 0
DIARRHEA: 0
DIZZINESS: 0
COUGH: 0
CONSTIPATION: 0
VOMITING: 0
WHEEZING: 0

## 2021-11-07 NOTE — CARE PLAN
The patient is Stable - Low risk of patient condition declining or worsening    Shift Goals  Clinical Goals: bowel movement  Patient Goals: bowel movement, nausea control    Progress made toward(s) clinical / shift goals:    Problem: Bowel Elimination  Goal: Establish and maintain regular bowel function  Outcome: Not Progressing  Note: Goal set for the day to have bowel movement and nausea control. PT still with nausea, no BM yet. Senna administered per MAR, prune juice given. Discussed importance of good water intake and ambulation to encourage BM. Pt verbalized understanding.           Patient is not progressing towards the following goals:      Problem: Bowel Elimination  Goal: Establish and maintain regular bowel function  Outcome: Not Progressing  Note: Goal set for the day to have bowel movement and nausea control. PT still with nausea, no BM yet. Senna administered per MAR, prune juice given. Discussed importance of good water intake and ambulation to encourage BM. Pt verbalized understanding.      Problem: Gastrointestinal Irritability  Goal: Nausea and vomiting will be absent or improve  Outcome: Not Progressing  Note: Pt still with nausea this morning after breakfast.  ODT zofran administered per MAR.

## 2021-11-07 NOTE — PROGRESS NOTES
Daily Progress Note:     Date of Service: 11/7/2021  Primary Team: [facility]   Attending: Juan Luis Madrigal M.D.   Senior Resident: Dr. Roberto Mai  Intern: Dr. Stefano Jacobsen  Contact:  [phone]    Chief Complaint:   Abdominal pain    Patient ID/Summary:  Pt is a 35 yo M with PMH of cholecystectomy, angioedema of unknown etiology, raynauds phenomenon, and mitral valve prolapse who presented to the ED 11/4/21 for 1 week history of intermittent RUQ/abdominal pain that radiates to the back. Admitted for cholelithiasis. GI consulted and will defer ERCP due to improving transaminitis and clinical symptoms. Will advance diet as tolerated, transition to PO meds and continue to monitor.    Interval Hx:  - No tenderness to palpation in her abdomen  - Still not able to tolerate her oral diet, however pain still present similar to her episodes before admission  - Able to get out of bed and move  - Continue bowel regimen to titrate bowel movement to once a day, could be low due to less oral intake of food vs constipation    Subjective:  Pt reports abdominal pain similar to how she was before admission with better controlled nausea. Denies vomiting, hematochezia, hematemesis, or melena. States she was able to get up and walk around the room with tolerance. Mild bowel movement more diarrhea like, not a full movement and no flatus however absent obstruction on imaging from admission. Denies sharp pain that radiates to back.    She also reports early fullness/satiety as if food gets to her stomach and stops. These symptoms started around admission with onset about last Thursday.    Consultants/Specialty:  Gastroenterology    Review of Systems:   Review of Systems   Constitutional: Negative for chills and fever.   HENT: Negative for sore throat.    Eyes: Negative for double vision.   Respiratory: Negative for cough, shortness of breath and wheezing.    Cardiovascular: Negative for chest pain, palpitations and leg swelling.    Gastrointestinal: Positive for abdominal pain. Negative for constipation, diarrhea, heartburn, nausea and vomiting.   Genitourinary: Negative for dysuria and urgency.   Musculoskeletal: Negative for myalgias.   Skin: Negative for rash.   Neurological: Negative for dizziness.       Objective Data:   Physical Exam:   Vitals:   Temp:  [36.4 °C (97.5 °F)-36.7 °C (98 °F)] 36.4 °C (97.5 °F)  Pulse:  [59-68] 59  Resp:  [16-17] 17  BP: (100-113)/(46-60) 100/50  SpO2:  [94 %-96 %] 96 %     Physical Exam  Vitals reviewed.   Constitutional:       General: She is not in acute distress.  HENT:      Head: Normocephalic and atraumatic.      Nose: Nose normal.      Mouth/Throat:      Mouth: Mucous membranes are moist.   Eyes:      General: No scleral icterus.  Cardiovascular:      Rate and Rhythm: Normal rate and regular rhythm.      Heart sounds: No murmur heard.  No friction rub. No gallop.    Pulmonary:      Effort: Pulmonary effort is normal. No respiratory distress.      Breath sounds: No wheezing, rhonchi or rales.   Abdominal:      General: Abdomen is flat. Bowel sounds are normal. There is no distension.      Palpations: Abdomen is soft.      Tenderness: There is no abdominal tenderness.   Musculoskeletal:      Cervical back: Neck supple.      Right lower leg: No tenderness. No edema.      Left lower leg: No tenderness. No edema.   Skin:     General: Skin is warm.   Neurological:      General: No focal deficit present.      Mental Status: She is alert.   Psychiatric:         Behavior: Behavior is cooperative.           Labs:   HEMATOLOGY/ ONCOLOGY/ID:            No results for input(s): WBC, RBC, HEMOGLOBIN, HEMATOCRIT, MCV, MCH, RDW, PLATELETCT, MPV, NEUTSPOLYS, LYMPHOCYTES, MONOCYTES, EOSINOPHILS, BASOPHILS, RBCMORPHOLO in the last 72 hours.  Lab Results   Component Value Date    FERRITIN 29.9 11/05/2021    IRON 99 11/05/2021    TOTIRONBC 249 (L) 11/05/2021       RENAL:        Estimated GFR/CRCL = Estimated Creatinine  Clearance: 127.1 mL/min (by C-G formula based on SCr of 0.65 mg/dL).  Recent Labs     21   SODIUM 139 140   POTASSIUM 3.8 4.0   CHLORIDE 107 105   CO2 24 24   GLUCOSE 82 91   BUN 8 3*   CREATININE 0.68 0.65   CALCIUM 8.4* 8.9   ALBUMIN 4.0 4.0       GASTROINTESTINAL/ HEPATIC:          Recent Labs     21   ALTSGPT 72* 38   ASTSGOT 60* 18   ALKPHOSPHAT 81 71   TBILIRUBIN 0.5 0.3   ALBUMIN 4.0 4.0   GLOBULIN 1.6* 2.5     No results found for: AMMONIA    ENDOCRINE:              Recent Labs     21   GLUCOSE 82 91     No results found for: HBA1C, TSH, FREET4, FREET3, CORTISOL     Imagin/4/21 MRCP  - Intrahepatic and extrahepatic ductal dilatation with intrahepatic ducts having beaded appearance primarily in the lateral segment of left hepatic lobe with hepatoliths. Findings may represent cholangitis. No pancreatic abnormalities to suggest autoimmune pancreatitis    21 CT abd/pelvis with contrast  - No acute inflammatory process in abdomen of pelvis  - Severe left intrahepatic biliary dilatation  - Mildly dilated CBD, prior cholecystectomy    Assessment/Plan:    * Choledocholithiasis  Assessment & Plan  Acute abd pain, hx of cholecystectomy in  for cholelithiasis. CT abd revealed L intrahepatic duct dilation, concerning for stricture. Liver enzymes consistent with acute intrahepatic etiology. Has had multiple episodes of emesis (x6, nonbloody). Very unlikely to be obstruction due to cholelithiasis. MRCP consistent with cholelithiasis. GI consulted and due to improved abdominal pain and downtrending AST (60 from 210) and ALT (72 from 110) they will defer ERCP and follow up outpatient.    Plan:  - Full liquid diet advance as tolerated  - Attempt to transition to PO meds for nausea and pain control  - repeat CMP shows resolved transaminitis  - Due to beaded imaging pattern seen on MRCP, ordered DICK, anti-smooth muscle, anti  mitochondrial  - Ferritin and TIBC both lower limit of normal  - Planning for discharge once able to tolerate diet  - Follow up with the GI consultants in 2-4 weeks    Nausea  Assessment & Plan  Nausea associated with choledocholithiasis    - Zofran oral and IV available, PO first line and IV if unable to tolerate  - Phenergan PRN for breakthrough    Leukocytosis  Assessment & Plan  Leukocytosis, likely reactive to ongoing intrahepatic process, does not appear septic, low concerns for cholangitis at this time    - Clinically improved and no signs of infection at this time  - Will do workup for cholangitis or infection if indicated    Hypokalemia- resolved  Assessment & Plan  Mild hypokalemia, likely related to acute emesis episodes          Code status: Full  Tubes/lines/drains: PIV  Diet: Full liquid advance as tolerated  DVT PPX: None  GI PPX: Zofran and phenergan  Disposition: Once patient is able to tolerate nausea or pain with oral medications. Possible discharge later today or tomorrow

## 2021-11-08 ENCOUNTER — APPOINTMENT (OUTPATIENT)
Dept: RADIOLOGY | Facility: MEDICAL CENTER | Age: 36
DRG: 445 | End: 2021-11-08
Attending: STUDENT IN AN ORGANIZED HEALTH CARE EDUCATION/TRAINING PROGRAM
Payer: COMMERCIAL

## 2021-11-08 PROBLEM — K59.00 CONSTIPATION: Status: ACTIVE | Noted: 2021-11-08

## 2021-11-08 PROBLEM — I73.00 RAYNAUD PHENOMENON: Status: RESOLVED | Noted: 2017-08-30 | Resolved: 2021-11-08

## 2021-11-08 LAB
ALBUMIN SERPL BCP-MCNC: 4.5 G/DL (ref 3.2–4.9)
ALBUMIN/GLOB SERPL: 1.4 G/DL
ALP SERPL-CCNC: 211 U/L (ref 30–99)
ALT SERPL-CCNC: 352 U/L (ref 2–50)
ANA INTERPRETIVE COMMENT Q5143: ABNORMAL
ANA PATTERN Q5144: ABNORMAL
ANA TITER Q5145: ABNORMAL
ANION GAP SERPL CALC-SCNC: 14 MMOL/L (ref 7–16)
ANTINUCLEAR ANTIBODY (ANA), HEP-2, IGG Q5142: DETECTED
AST SERPL-CCNC: 455 U/L (ref 12–45)
BASOPHILS # BLD AUTO: 0.5 % (ref 0–1.8)
BASOPHILS # BLD: 0.03 K/UL (ref 0–0.12)
BILIRUB SERPL-MCNC: 2.4 MG/DL (ref 0.1–1.5)
BUN SERPL-MCNC: 5 MG/DL (ref 8–22)
CALCIUM SERPL-MCNC: 9.2 MG/DL (ref 8.5–10.5)
CHLORIDE SERPL-SCNC: 100 MMOL/L (ref 96–112)
CO2 SERPL-SCNC: 23 MMOL/L (ref 20–33)
CREAT SERPL-MCNC: 0.73 MG/DL (ref 0.5–1.4)
EOSINOPHIL # BLD AUTO: 0.11 K/UL (ref 0–0.51)
EOSINOPHIL NFR BLD: 1.7 % (ref 0–6.9)
ERYTHROCYTE [DISTWIDTH] IN BLOOD BY AUTOMATED COUNT: 41.8 FL (ref 35.9–50)
GLOBULIN SER CALC-MCNC: 3.2 G/DL (ref 1.9–3.5)
GLUCOSE SERPL-MCNC: 94 MG/DL (ref 65–99)
HCT VFR BLD AUTO: 40.8 % (ref 37–47)
HGB BLD-MCNC: 13 G/DL (ref 12–16)
IGG SERPL-MCNC: 918 MG/DL (ref 768–1632)
IGG1 SER-MCNC: 500 MG/DL (ref 240–1118)
IGG2 SER-MCNC: 309 MG/DL (ref 124–549)
IGG3 SER-MCNC: 25 MG/DL (ref 21–134)
IGG4 SER-MCNC: 57 MG/DL (ref 1–123)
IMM GRANULOCYTES # BLD AUTO: 0.02 K/UL (ref 0–0.11)
IMM GRANULOCYTES NFR BLD AUTO: 0.3 % (ref 0–0.9)
INR PPP: 1.21 (ref 0.87–1.13)
LYMPHOCYTES # BLD AUTO: 1.07 K/UL (ref 1–4.8)
LYMPHOCYTES NFR BLD: 16.5 % (ref 22–41)
MCH RBC QN AUTO: 29.1 PG (ref 27–33)
MCHC RBC AUTO-ENTMCNC: 31.9 G/DL (ref 33.6–35)
MCV RBC AUTO: 91.3 FL (ref 81.4–97.8)
MONOCYTES # BLD AUTO: 0.41 K/UL (ref 0–0.85)
MONOCYTES NFR BLD AUTO: 6.3 % (ref 0–13.4)
NEUTROPHILS # BLD AUTO: 4.83 K/UL (ref 2–7.15)
NEUTROPHILS NFR BLD: 74.7 % (ref 44–72)
NRBC # BLD AUTO: 0 K/UL
NRBC BLD-RTO: 0 /100 WBC
PLATELET # BLD AUTO: 268 K/UL (ref 164–446)
PMV BLD AUTO: 12.1 FL (ref 9–12.9)
POTASSIUM SERPL-SCNC: 3.4 MMOL/L (ref 3.6–5.5)
PROT SERPL-MCNC: 7.7 G/DL (ref 6–8.2)
PROTHROMBIN TIME: 15 SEC (ref 12–14.6)
RBC # BLD AUTO: 4.47 M/UL (ref 4.2–5.4)
SODIUM SERPL-SCNC: 137 MMOL/L (ref 135–145)
WBC # BLD AUTO: 6.5 K/UL (ref 4.8–10.8)

## 2021-11-08 PROCEDURE — A9270 NON-COVERED ITEM OR SERVICE: HCPCS

## 2021-11-08 PROCEDURE — 700111 HCHG RX REV CODE 636 W/ 250 OVERRIDE (IP)

## 2021-11-08 PROCEDURE — 74177 CT ABD & PELVIS W/CONTRAST: CPT

## 2021-11-08 PROCEDURE — 99233 SBSQ HOSP IP/OBS HIGH 50: CPT | Mod: GC | Performed by: HOSPITALIST

## 2021-11-08 PROCEDURE — 770004 HCHG ROOM/CARE - ONCOLOGY PRIVATE *

## 2021-11-08 PROCEDURE — 36415 COLL VENOUS BLD VENIPUNCTURE: CPT

## 2021-11-08 PROCEDURE — 700102 HCHG RX REV CODE 250 W/ 637 OVERRIDE(OP)

## 2021-11-08 PROCEDURE — 700117 HCHG RX CONTRAST REV CODE 255: Performed by: STUDENT IN AN ORGANIZED HEALTH CARE EDUCATION/TRAINING PROGRAM

## 2021-11-08 PROCEDURE — 80053 COMPREHEN METABOLIC PANEL: CPT

## 2021-11-08 PROCEDURE — A9270 NON-COVERED ITEM OR SERVICE: HCPCS | Performed by: STUDENT IN AN ORGANIZED HEALTH CARE EDUCATION/TRAINING PROGRAM

## 2021-11-08 PROCEDURE — 700102 HCHG RX REV CODE 250 W/ 637 OVERRIDE(OP): Performed by: STUDENT IN AN ORGANIZED HEALTH CARE EDUCATION/TRAINING PROGRAM

## 2021-11-08 PROCEDURE — 85610 PROTHROMBIN TIME: CPT

## 2021-11-08 PROCEDURE — 85025 COMPLETE CBC W/AUTO DIFF WBC: CPT

## 2021-11-08 RX ORDER — BISACODYL 10 MG
10 SUPPOSITORY, RECTAL RECTAL ONCE
Status: DISCONTINUED | OUTPATIENT
Start: 2021-11-08 | End: 2021-11-09

## 2021-11-08 RX ADMIN — SENNOSIDES AND DOCUSATE SODIUM 2 TABLET: 50; 8.6 TABLET ORAL at 06:34

## 2021-11-08 RX ADMIN — OXYCODONE 5 MG: 5 TABLET ORAL at 00:04

## 2021-11-08 RX ADMIN — IOHEXOL 100 ML: 350 INJECTION, SOLUTION INTRAVENOUS at 17:00

## 2021-11-08 RX ADMIN — OXYCODONE 5 MG: 5 TABLET ORAL at 06:34

## 2021-11-08 RX ADMIN — POLYETHYLENE GLYCOL 3350 1 PACKET: 17 POWDER, FOR SOLUTION ORAL at 06:34

## 2021-11-08 RX ADMIN — ONDANSETRON 4 MG: 4 TABLET, ORALLY DISINTEGRATING ORAL at 15:35

## 2021-11-08 ASSESSMENT — ENCOUNTER SYMPTOMS
BLOOD IN STOOL: 0
WEAKNESS: 0
DIARRHEA: 0
HEARTBURN: 0
MUSCULOSKELETAL NEGATIVE: 1
INSOMNIA: 0
TINGLING: 0
DIZZINESS: 1
SINUS PAIN: 0
SHORTNESS OF BREATH: 0
SEIZURES: 0
HEADACHES: 0
DEPRESSION: 0
FEVER: 0
DOUBLE VISION: 0
PHOTOPHOBIA: 0
BLURRED VISION: 0
PALPITATIONS: 0
WEIGHT LOSS: 0
DIAPHORESIS: 0
FOCAL WEAKNESS: 0
COUGH: 0
ORTHOPNEA: 0
NAUSEA: 1
ABDOMINAL PAIN: 1
EYE DISCHARGE: 0
CHILLS: 0
HEMOPTYSIS: 0
TREMORS: 0
VOMITING: 0
CONSTIPATION: 1
LOSS OF CONSCIOUSNESS: 0

## 2021-11-08 ASSESSMENT — PAIN DESCRIPTION - PAIN TYPE
TYPE: ACUTE PAIN

## 2021-11-08 NOTE — ASSESSMENT & PLAN NOTE
No indication of infection at this time. No elevated WBC 11/8/2021         pt alert and awake x3, BIBA, incoherent speech, ALOOB, as per ems, pt was found whiping himself in public parking lot, denies LOC, pt disrobed and placed in yellow gown, belongings locked.

## 2021-11-08 NOTE — PROGRESS NOTES
Patient reported she had flushing, was lightheaded and had abdominal pain after eating some of her breakfast this morning. She is currently on a full liquid diet. Pt denies c/o nausea or vomiting. Offered pt pain medications but she declined. Updated [facility] resident, Dr. Cole, regarding pts symptoms after attempting to eat this morning. Dr. Cole and [facility] team will be at bedside sometime after 11am to see patient and answer her and her mom's questions.     COVID-19 surge in effect.

## 2021-11-08 NOTE — DISCHARGE PLANNING
Anticipated Discharge Disposition: Home to her previous living arrangement.    Action: This RN,  reviewed patient's chart, patient has discharge orders today. Patient is tolerating nausea and pain with oral medications, anticipate will discharge today.    Barriers to Discharge: None    Plan: Patient is discharging home today to her prior living arrangement, no needs from case management.    1642: Patient's discharge orders were held, received a voice message from patient's insurance company's , Melissa, [phone], if patient will need any discharge planning assistance, please reach out to Insurance CM.     Abbey Billings RN,

## 2021-11-08 NOTE — ASSESSMENT & PLAN NOTE
Resolved, will monitor post procedure.     Plan:  -Monitor bowel movements, evaluate as needed  -Bowel regimen  -Decrease Opioid medications

## 2021-11-08 NOTE — ASSESSMENT & PLAN NOTE
Assessment:   Acute abdominal pain, history of cholecystectomy in 2011 for cholelithiasis. CT abd revealed L intrahepatic duct dilation, concerning for stricture. Liver enzymes consistent with acute intrahepatic etiology. Has had multiple episodes of emesis (x6, nonbloody). Very unlikely to be obstruction due to cholelithiasis. MRCP consistent with cholelithiasis.   GI consulted and due to improved abdominal pain and downtrending AST (18 from 210) and ALT (38 from 110). Current plan defer ERCP as stone seems to have passed and will need follow up outpatient.  11/8: CT showed evidence of dilated CBD, re-consulted GI. Elevated LFTs, indicating inflammatory process, and/or obstruction.   Continues to have bloating sensation, pressure with palpation.   11/11: ERCP was performed, bile duct stone was removed. Images taken.      Plan:  - Full liquid diet advance, NPO midnight  - Attempt to transition to PO meds for nausea and pain control  - repeat CMP, LFTs uptrending  -ERCP tomorrow, 11/11  -GI following

## 2021-11-08 NOTE — ASSESSMENT & PLAN NOTE
Increased nausea associated with increased pain over the past twenty four hours. Denies any current nausea on evaluation.   Nausea maybe associated with choledocholithiasis, and/or opiate use.     Plan:  -Decrease IV pain medication use  - Zofran oral and IV available, PO first line and IV if unable to tolerate  - Phenergan PRN for breakthrough

## 2021-11-09 LAB
DSDNA AB TITR SER CLIF: 0 IU (ref 0–24)
ENA JO1 AB TITR SER: 2 AU/ML (ref 0–40)
ENA SCL70 IGG SER QL: 0 AU/ML (ref 0–40)
ENA SM IGG SER-ACNC: 2 AU/ML (ref 0–40)
ENA SS-B IGG SER IA-ACNC: 0 AU/ML (ref 0–40)
LIPASE SERPL-CCNC: 22 U/L (ref 11–82)
MAGNESIUM SERPL-MCNC: 2.1 MG/DL (ref 1.5–2.5)
SSA52 R0ENA AB IGG Q0420: 1 AU/ML (ref 0–40)
SSA60 R0ENA AB IGG Q0419: 0 AU/ML (ref 0–40)
U1 SNRNP IGG SER QL: 3

## 2021-11-09 PROCEDURE — 770004 HCHG ROOM/CARE - ONCOLOGY PRIVATE *

## 2021-11-09 PROCEDURE — 700105 HCHG RX REV CODE 258

## 2021-11-09 PROCEDURE — 700102 HCHG RX REV CODE 250 W/ 637 OVERRIDE(OP): Performed by: STUDENT IN AN ORGANIZED HEALTH CARE EDUCATION/TRAINING PROGRAM

## 2021-11-09 PROCEDURE — 83735 ASSAY OF MAGNESIUM: CPT

## 2021-11-09 PROCEDURE — 83690 ASSAY OF LIPASE: CPT

## 2021-11-09 PROCEDURE — A9270 NON-COVERED ITEM OR SERVICE: HCPCS | Performed by: STUDENT IN AN ORGANIZED HEALTH CARE EDUCATION/TRAINING PROGRAM

## 2021-11-09 PROCEDURE — A9270 NON-COVERED ITEM OR SERVICE: HCPCS

## 2021-11-09 PROCEDURE — 36415 COLL VENOUS BLD VENIPUNCTURE: CPT

## 2021-11-09 PROCEDURE — 700102 HCHG RX REV CODE 250 W/ 637 OVERRIDE(OP)

## 2021-11-09 PROCEDURE — 99232 SBSQ HOSP IP/OBS MODERATE 35: CPT | Mod: GC | Performed by: HOSPITALIST

## 2021-11-09 RX ORDER — SODIUM CHLORIDE, SODIUM LACTATE, POTASSIUM CHLORIDE, AND CALCIUM CHLORIDE .6; .31; .03; .02 G/100ML; G/100ML; G/100ML; G/100ML
500 INJECTION, SOLUTION INTRAVENOUS ONCE
Status: COMPLETED | OUTPATIENT
Start: 2021-11-09 | End: 2021-11-09

## 2021-11-09 RX ORDER — POTASSIUM CHLORIDE 20 MEQ/1
20 TABLET, EXTENDED RELEASE ORAL ONCE
Status: COMPLETED | OUTPATIENT
Start: 2021-11-09 | End: 2021-11-09

## 2021-11-09 RX ORDER — POTASSIUM CHLORIDE 20 MEQ/1
40 TABLET, EXTENDED RELEASE ORAL ONCE
Status: COMPLETED | OUTPATIENT
Start: 2021-11-09 | End: 2021-11-09

## 2021-11-09 RX ORDER — POTASSIUM CHLORIDE 20 MEQ/1
20 TABLET, EXTENDED RELEASE ORAL ONCE
Status: DISCONTINUED | OUTPATIENT
Start: 2021-11-09 | End: 2021-11-09

## 2021-11-09 RX ADMIN — POTASSIUM CHLORIDE 40 MEQ: 1500 TABLET, EXTENDED RELEASE ORAL at 06:38

## 2021-11-09 RX ADMIN — POTASSIUM CHLORIDE 20 MEQ: 1500 TABLET, EXTENDED RELEASE ORAL at 10:56

## 2021-11-09 RX ADMIN — SODIUM CHLORIDE, POTASSIUM CHLORIDE, SODIUM LACTATE AND CALCIUM CHLORIDE 500 ML: 600; 310; 30; 20 INJECTION, SOLUTION INTRAVENOUS at 14:55

## 2021-11-09 RX ADMIN — ACETAMINOPHEN 650 MG: 325 TABLET ORAL at 20:33

## 2021-11-09 ASSESSMENT — ENCOUNTER SYMPTOMS
ABDOMINAL PAIN: 1
ABDOMINAL PAIN: 0
COUGH: 0
PALPITATIONS: 0
NAUSEA: 1
PSYCHIATRIC NEGATIVE: 1
VOMITING: 0
RESPIRATORY NEGATIVE: 1
NAUSEA: 0
DIARRHEA: 1
CONSTIPATION: 0
FEVER: 0
DIAPHORESIS: 0
BLOOD IN STOOL: 0
SHORTNESS OF BREATH: 0
CHILLS: 0
NEUROLOGICAL NEGATIVE: 1
WEIGHT LOSS: 0
CARDIOVASCULAR NEGATIVE: 1
MUSCULOSKELETAL NEGATIVE: 1
EYES NEGATIVE: 1
HEARTBURN: 0

## 2021-11-09 ASSESSMENT — PAIN DESCRIPTION - PAIN TYPE
TYPE: ACUTE PAIN
TYPE: ACUTE PAIN

## 2021-11-09 ASSESSMENT — PATIENT HEALTH QUESTIONNAIRE - PHQ9
SUM OF ALL RESPONSES TO PHQ9 QUESTIONS 1 AND 2: 0
2. FEELING DOWN, DEPRESSED, IRRITABLE, OR HOPELESS: NOT AT ALL
1. LITTLE INTEREST OR PLEASURE IN DOING THINGS: NOT AT ALL

## 2021-11-09 NOTE — DIETARY
Nutrition Update:  Patient is currently NPO and has not been able to tolerate PO diet.  Per nursing, awaiting GI for plan.    RD following plan of care.

## 2021-11-09 NOTE — CARE PLAN
Problem: Pain - Standard  Goal: Alleviation of pain or a reduction in pain to the patient’s comfort goal  Outcome: Progressing  Flowsheets (Taken 11/9/2021 9212)  Non Verbal Scale: Calm  Note: Pain tolerable at this time 2/10     Problem: Knowledge Deficit - Standard  Goal: Patient and family/care givers will demonstrate understanding of plan of care, disease process/condition, diagnostic tests and medications  Outcome: Progressing  Note: Education provided regarding disease process and POC   The patient is Watcher - Medium risk of patient condition declining or worsening    Shift Goals  Clinical Goals: tolerate diet, NPO at 0000  Patient Goals: Pain and nausea control     Progress made toward(s) clinical / shift goals:  No N/V    Patient is not progressing towards the following goals:

## 2021-11-09 NOTE — CARE PLAN
Problem: Nutritional:  Goal: Achieve adequate nutritional intake  Description: Patient will tolerate PO diet and consume 50% of meals  Outcome: Not Met

## 2021-11-09 NOTE — CARE PLAN
The patient is Stable - Low risk of patient condition declining or worsening    Shift Goals  Clinical Goals: tolerate diet, NPO at 0000  Patient Goals: Pain and nausea control     Progress made toward(s) clinical / shift goals:    Plan of care reviewed with pt, all questions and concerns addressed. Pt reminded about being NPO at 0000, pt verbalizes understanding and agreeable. Pt reminded to use call light when in need of assistance.  Pt denies pain and nausea at this time.    Problem: Pain - Standard  Goal: Alleviation of pain or a reduction in pain to the patient’s comfort goal  Outcome: Progressing     Problem: Knowledge Deficit - Standard  Goal: Patient and family/care givers will demonstrate understanding of plan of care, disease process/condition, diagnostic tests and medications  Outcome: Progressing     Problem: Bowel Elimination  Goal: Establish and maintain regular bowel function  Outcome: Progressing     Problem: Gastrointestinal Irritability  Goal: Nausea and vomiting will be absent or improve  Outcome: Progressing  Goal: Diarrhea will be absent or improved  Outcome: Progressing

## 2021-11-09 NOTE — PROGRESS NOTES
"Daily Progress Note:     Date of Service: 11/9/2021  Primary Team: [facility] IM Green Team   Attending: Juan Luis Madrigal M.D.   Senior Resident: Dr. Tyler Floyd  Intern: Dr. Ce Cole  Contact:  [phone]    Chief Complaint:  Abdominal Pain    Patient ID/Summary: Patient is a 36 year old female with a past medical history of cholecystectomy, angioedema of unknown origin, Raynaud phenomenon and mitral valve prolapse. She presented on 11/4/21 after a 1 week history of intermittent right upper quadrant pain that radiated to the back. Imaging indicated cholelithiasis and she was admitted. GI was consulted and ERCP was deferred due to initial improving transmamitinits and clinical symptoms.   She had increased abdominal pain and was not tolerating PO intake as of yesterday morning (11/8). A repeat CT was performed, concern for colonic ileus was found on imaging and as well as a 13 mm dilation of the common bile duct.   ERCP tomorrow by GI.       Subjective: Patient states that overall she has been doing about the same. She is not complaining of any abdominal pain, but still is experiencing \"fullness\". She had diarrhea starting at 1500 yesterday onto 2200 last night. She has not had a bowel movement this morning. She denies any fevers, or chills. Overall she felt worse yesterday, but still does not feel optimal.     Consultants/Specialty:  Gastroenterology      Review of Systems:    Review of Systems   Constitutional: Negative for chills, diaphoresis, fever, malaise/fatigue and weight loss.   HENT: Negative.  Negative for congestion, ear pain, hearing loss, nosebleeds and tinnitus.    Eyes: Negative.    Respiratory: Negative.  Negative for cough and shortness of breath.    Cardiovascular: Negative for chest pain, palpitations and leg swelling.   Gastrointestinal: Positive for diarrhea (Yesterday evening). Negative for abdominal pain, blood in stool, constipation, heartburn, nausea and vomiting.   Genitourinary: " Negative.    Musculoskeletal: Negative.    Skin: Negative for itching and rash.   Neurological: Negative.    Endo/Heme/Allergies: Negative.    Psychiatric/Behavioral: Negative.        Objective Data:      Vitals:   Temp:  [36.5 °C (97.7 °F)-37.2 °C (98.9 °F)] 37.1 °C (98.8 °F)  Pulse:  [58-64] 62  Resp:  [16-18] 16  BP: (101-108)/(41-60) 106/53  SpO2:  [94 %-97 %] 97 %      Physical Exam  Constitutional:       General: She is not in acute distress.     Appearance: Normal appearance. She is not ill-appearing, toxic-appearing or diaphoretic.   HENT:      Head: Normocephalic and atraumatic.      Nose: Nose normal.      Mouth/Throat:      Mouth: Mucous membranes are moist.      Pharynx: Oropharynx is clear. No oropharyngeal exudate or posterior oropharyngeal erythema.   Eyes:      General: No scleral icterus.        Right eye: No discharge.         Left eye: No discharge.      Extraocular Movements: Extraocular movements intact.      Conjunctiva/sclera: Conjunctivae normal.      Pupils: Pupils are equal, round, and reactive to light.   Cardiovascular:      Rate and Rhythm: Normal rate and regular rhythm.      Pulses: Normal pulses.      Heart sounds: Normal heart sounds. No murmur heard.  No friction rub. No gallop.    Pulmonary:      Effort: Pulmonary effort is normal. No respiratory distress.      Breath sounds: Normal breath sounds. No stridor. No wheezing, rhonchi or rales.   Chest:      Chest wall: No tenderness.   Abdominal:      General: Abdomen is flat. A surgical scar is present. Bowel sounds are decreased. There is no distension.      Tenderness: There is abdominal tenderness in the epigastric area. There is no guarding or rebound.      Hernia: No hernia is present.   Musculoskeletal:      Cervical back: Normal range of motion. No rigidity or tenderness.   Neurological:      Mental Status: She is alert.           Labs:   Recent Labs     11/06/21 2045 11/08/21  1740   SODIUM 140 137   POTASSIUM 4.0 3.4*    CHLORIDE 105 100   CO2 24 23   GLUCOSE 91 94   BUN 3* 5*     Recent Labs     11/06/21 2045 11/08/21  1740   ALBUMIN 4.0 4.5   TBILIRUBIN 0.3 2.4*   ALKPHOSPHAT 71 211*   TOTPROTEIN 6.5 7.7   ALTSGPT 38 352*   ASTSGOT 18 455*   CREATININE 0.65 0.73     Recent Labs     11/08/21  1740   WBC 6.5   RBC 4.47   HEMOGLOBIN 13.0   HEMATOCRIT 40.8   MCV 91.3   MCH 29.1   RDW 41.8   PLATELETCT 268   MPV 12.1   NEUTSPOLYS 74.70*   LYMPHOCYTES 16.50*   MONOCYTES 6.30   EOSINOPHILS 1.70   BASOPHILS 0.50     Lipase: 22      Imaging:     CT 11/8/2021: Dilated CBD and intrahepatic bile ducts, worsening. CBD 13 mm.   Air fluid levels in colon, concern for colonic ileus or diarrheal disease    Problem Representation:     * Choledocholithiasis  Assessment & Plan  Assessment:   Acute abdominal pain, history of cholecystectomy in 2011 for cholelithiasis. CT abd revealed L intrahepatic duct dilation, concerning for stricture. Liver enzymes consistent with acute intrahepatic etiology. Has had multiple episodes of emesis (x6, nonbloody). Very unlikely to be obstruction due to cholelithiasis. MRCP consistent with cholelithiasis.   GI consulted and due to improved abdominal pain and downtrending AST (18 from 210) and ALT (38 from 110). Current plan defer ERCP as stone seems to have passed and will need follow up outpatient.  11/8: CT showed evidence of dilated CBD, re-consulted GI. Elevated LFTs, indicating inflammatory process, and/or obstruction.      Plan:  - Full liquid diet advance, NPO midnight  - Attempt to transition to PO meds for nausea and pain control  - repeat CMP, LFTs uptrending  -ERCP tomorrow  -GI following    Leukocytosis  Assessment & Plan  No indication of infection at this time. No elevated WBC 11/8/2021          Constipation  Assessment & Plan  Increased diarrhea yesterday from 1500 to 2200.     Plan:  -Monitor bowel movements, evaluate as needed  -Bowel regimen  -Decrease Opioid medications    Nausea  Assessment &  Plan  Increased nausea associated with increased pain over the past twenty four hours. Denies any current nausea on evaluation.   Nausea maybe associated with choledocholithiasis, and/or opiate use.     Plan:  -Decrease IV pain medication use  - Zofran oral and IV available, PO first line and IV if unable to tolerate  - Phenergan PRN for breakthrough    Hypokalemia  Assessment & Plan  Replenished 40 mEq, BID, four hours apart.

## 2021-11-09 NOTE — CONSULTS
"Gastroenterology Consult Note:    TODD Cobos  Date & Time note created:    11/9/2021   11:54 AM     Referring MD:  Dr. Juan Luis Madrigal  Patient ID:  Name:             Karina Stern   YOB: 1985  Age:                 36 y.o.  female   MRN:               [id number]                                                             Reason for Consult:      1. Elevated LFT's  2. RUQ pain    History of Present Illness:    This is a 35 yo female PMH angioedema (unknown etiology), mitral valve prolapse, Raynauds who was admitted to the hospital 11/4/21 with a 1 week history of intermittent RUQ/epigastric abdominal pain radiating to back, N/V. First episode was last week, seen at [facility] and was diagnosed with gastritis. Discharged home on a bland diet with some improvement in symptoms but had another episode of similar pain yesterday. Abnormal ER labs revealed WBC 12.2. Potassium 3.5. Glucose 117. AST: 210. ALT: 110. ALP: 128. TB: 1.0.     CT scan abdomen/pelvis: Severe left intrahepatic biliary dilatation, etiology uncertain. Consider MRCP to evaluate for a possible stricture near the confluence of the right and left hepatic ducts. Mildly dilated CBD. Prior cholecystectomy.    MRCP:   1.  Intrahepatic and extrahepatic ductal dilatation. Intrahepatic ducts have a beaded appearance, primarily in the lateral segment of the left hepatic lobe with hepatoliths. Findings may represent primary sclerosing cholangitis or IgG4 related   cholangitis. No pancreatic abnormalities to suggest autoimmune pancreatitis   2.  Common bile duct dilatation with choledocholithiasis.   3.  Pancreatic divisum is incidentally noted.    Initially, the abdominal pain improved. Her LFT's were trending down. Felt that the stone passed so ERCP was deferred due to improvement in condition. However, she continued to have intermittent episodes of \"full blown attack\" abdominal pain, Nausea with dry heeves. She has not been " tolerating po intake. Yesterday, Her LFT's started to rise.     Labs 11/8/21: Total bilirubin: 2.4 (0.3 on 11/6/21). ALP: 211 (71 on 11/6/21). AST: 455 (18 on 11/6/21). ALT: 352 (38 on 11/6/21). DICK detected with positive titer: 1:320. AMA: 2.4 (N). IgG4: 57 (normal).    CT scan abdomen/pelvis 11/8/21: Dilated CBD and intrahepatic bile ducts, worse than prior, concerning for distal obstruction. The CBD measures 13 mm.      Air-fluid levels throughout the colon, concerning for diarrheal disease or colonic ileus    Denies history of inflammatory bowel disease or autoimmune disease. Mother diagnosed with myasthenia gravis and fibromyalgia.    Review of Systems:      Review of Systems   Constitutional: Positive for malaise/fatigue.   HENT: Negative.    Eyes: Negative.    Respiratory: Negative.    Cardiovascular: Negative.    Gastrointestinal: Positive for abdominal pain and nausea.   Genitourinary: Negative.    Musculoskeletal: Negative.    Skin: Negative.    Neurological: Negative.    Psychiatric/Behavioral: Negative.              Physical Exam:  Vitals/ General Appearance:   Weight/BMI: Body mass index is 26.17 kg/m².    Vitals:    11/08/21 1548 11/08/21 2214 11/09/21 0342 11/09/21 0741   BP: 108/61 101/60 108/41 104/60   Pulse: 70 64 (!) 58 62   Resp: 16 18 16 18   Temp: 37 °C (98.6 °F) 36.6 °C (97.8 °F) 37.2 °C (98.9 °F) 36.5 °C (97.7 °F)   TempSrc: Temporal Temporal Temporal Temporal   SpO2: 97%  95% 94%   Weight:       Height:         Oxygen Therapy:  Pulse Oximetry: 94 %, O2 Delivery Device: None - Room Air    Physical Exam  Vitals and nursing note reviewed.   Constitutional:       Appearance: Normal appearance.   HENT:      Head: Normocephalic and atraumatic.      Right Ear: Tympanic membrane normal.      Left Ear: Tympanic membrane normal.      Mouth/Throat:      Mouth: Mucous membranes are dry.   Eyes:      General: No scleral icterus.     Extraocular Movements: Extraocular movements intact.      Pupils:  Pupils are equal, round, and reactive to light.   Cardiovascular:      Rate and Rhythm: Normal rate and regular rhythm.      Pulses: Normal pulses.      Heart sounds: Normal heart sounds.   Pulmonary:      Effort: Pulmonary effort is normal.      Breath sounds: Normal breath sounds.   Abdominal:      General: Abdomen is flat. Bowel sounds are normal.      Palpations: Abdomen is soft.      Tenderness: There is abdominal tenderness (epigastric/RUQ).   Musculoskeletal:         General: Normal range of motion.      Cervical back: Normal range of motion and neck supple.   Skin:     General: Skin is warm and dry.      Capillary Refill: Capillary refill takes less than 2 seconds.      Coloration: Skin is not jaundiced.   Neurological:      General: No focal deficit present.      Mental Status: She is alert and oriented to person, place, and time.   Psychiatric:         Mood and Affect: Mood normal.         Behavior: Behavior normal.         Thought Content: Thought content normal.         Judgment: Judgment normal.         Past Medical History:   History reviewed. No pertinent past medical history.    Past Surgical History:  Past Surgical History:   Procedure Laterality Date   • CHOLECYSTECTOMY     • ERCP     • PRIMARY C SECTION         Hospital Medications:    Current Facility-Administered Medications:   •  bisacodyl (DULCOLAX) suppository 10 mg, 10 mg, Rectal, Once, Ce Cole M.D.  •  senna-docusate (PERICOLACE or SENOKOT S) 8.6-50 MG per tablet 2 Tablet, 2 Tablet, Oral, BID, 2 Tablet at 11/08/21 0634 **AND** polyethylene glycol/lytes (MIRALAX) PACKET 1 Packet, 1 Packet, Oral, DAILY, 1 Packet at 11/08/21 0634 **AND** magnesium hydroxide (MILK OF MAGNESIA) suspension 30 mL, 30 mL, Oral, QDAY PRN **AND** [DISCONTINUED] bisacodyl (DULCOLAX) suppository 10 mg, 10 mg, Rectal, QDAY PRN, Stefano Jacobsen D.O.  •  acetaminophen (Tylenol) tablet 650 mg, 650 mg, Oral, Q6HRS PRN, Rudi Galvan M.D., 650 mg at  11/04/21 2149  •  ondansetron (ZOFRAN) syringe/vial injection 4 mg, 4 mg, Intravenous, Q4HRS PRN, Stefano Jacobsen D.O., 4 mg at 11/05/21 1934  •  ondansetron (ZOFRAN ODT) dispertab 4 mg, 4 mg, Oral, Q4HRS PRN, Stefano Jacobsen D.O., 4 mg at 11/08/21 1535  •  promethazine (PHENERGAN) tablet 12.5-25 mg, 12.5-25 mg, Oral, Q4HRS PRN, Rudi Galvan M.D., 25 mg at 11/07/21 2233  •  promethazine (PHENERGAN) suppository 12.5-25 mg, 12.5-25 mg, Rectal, Q4HRS PRN, Rudi Galvan M.D.  •  oxyCODONE immediate-release (ROXICODONE) tablet 5 mg, 5 mg, Oral, Q4HRS PRN, Rudi Galvan M.D., 5 mg at 11/08/21 0634    Current Outpatient Medications:  Current Facility-Administered Medications   Medication Dose Route Frequency Provider Last Rate Last Admin   • bisacodyl (DULCOLAX) suppository 10 mg  10 mg Rectal Once Ce Cole M.D.       • polyethylene glycol/lytes (MIRALAX) PACKET 1 Packet  1 Packet Oral DAILY Stefano Jacobsen D.O.   1 Packet at 11/08/21 0634    And   • senna-docusate (PERICOLACE or SENOKOT S) 8.6-50 MG per tablet 2 Tablet  2 Tablet Oral BID Stefano aJcobsen D.O.   2 Tablet at 11/08/21 0634    And   • magnesium hydroxide (MILK OF MAGNESIA) suspension 30 mL  30 mL Oral QDAY PRN Stefano Jacobsen D.O.       • acetaminophen (Tylenol) tablet 650 mg  650 mg Oral Q6HRS PRN Rudi Galvan M.D.   650 mg at 11/04/21 2149   • ondansetron (ZOFRAN) syringe/vial injection 4 mg  4 mg Intravenous Q4HRS PRN Stefano Jacobsen D.O.   4 mg at 11/05/21 1934   • ondansetron (ZOFRAN ODT) dispertab 4 mg  4 mg Oral Q4HRS PRN Stefano Jacobsen, D.O.   4 mg at 11/08/21 1535   • promethazine (PHENERGAN) tablet 12.5-25 mg  12.5-25 mg Oral Q4HRS PRN Rudi Galvan M.D.   25 mg at 11/07/21 2233   • promethazine (PHENERGAN) suppository 12.5-25 mg  12.5-25 mg Rectal Q4HRS PRN Rudi Galvan M.D.       • oxyCODONE immediate-release (ROXICODONE) tablet 5 mg  5 mg Oral Q4HRS PRN Rudi Galvan M.D.    5 mg at 11/08/21 0634       Medication Allergy:  Allergies   Allergen Reactions   • Cantaloupe      Itchy throat   • Dilaudid [Hydromorphone Hcl]      Patient doesn't specifically recall event, thinks she possibly stopped breathing. Has tolerated oxycodone, hydrocodone, and morphine historically without issues.        Family History:  History reviewed. No pertinent family history.    Social History:  Social History     Socioeconomic History   • Marital status: Single     Spouse name: Not on file   • Number of children: Not on file   • Years of education: Not on file   • Highest education level: Not on file   Occupational History   • Not on file   Tobacco Use   • Smoking status: Never Smoker   • Smokeless tobacco: Never Used   Substance and Sexual Activity   • Alcohol use: No   • Drug use: No   • Sexual activity: Not on file   Other Topics Concern   • Not on file   Social History Narrative   • Not on file     Social Determinants of Health     Financial Resource Strain:    • Difficulty of Paying Living Expenses: Not on file   Food Insecurity:    • Worried About Running Out of Food in the Last Year: Not on file   • Ran Out of Food in the Last Year: Not on file   Transportation Needs:    • Lack of Transportation (Medical): Not on file   • Lack of Transportation (Non-Medical): Not on file   Physical Activity:    • Days of Exercise per Week: Not on file   • Minutes of Exercise per Session: Not on file   Stress:    • Feeling of Stress : Not on file   Social Connections:    • Frequency of Communication with Friends and Family: Not on file   • Frequency of Social Gatherings with Friends and Family: Not on file   • Attends Taoist Services: Not on file   • Active Member of Clubs or Organizations: Not on file   • Attends Club or Organization Meetings: Not on file   • Marital Status: Not on file   Intimate Partner Violence:    • Fear of Current or Ex-Partner: Not on file   • Emotionally Abused: Not on file   • Physically  Abused: Not on file   • Sexually Abused: Not on file   Housing Stability:    • Unable to Pay for Housing in the Last Year: Not on file   • Number of Places Lived in the Last Year: Not on file   • Unstable Housing in the Last Year: Not on file         MDM (Data Review):     Records reviewed and summarized in current documentation    Lab Data Review:  Recent Results (from the past 24 hour(s))   CBC WITH DIFFERENTIAL    Collection Time: 11/08/21  5:40 PM   Result Value Ref Range    WBC 6.5 4.8 - 10.8 K/uL    RBC 4.47 4.20 - 5.40 M/uL    Hemoglobin 13.0 12.0 - 16.0 g/dL    Hematocrit 40.8 37.0 - 47.0 %    MCV 91.3 81.4 - 97.8 fL    MCH 29.1 27.0 - 33.0 pg    MCHC 31.9 (L) 33.6 - 35.0 g/dL    RDW 41.8 35.9 - 50.0 fL    Platelet Count 268 164 - 446 K/uL    MPV 12.1 9.0 - 12.9 fL    Neutrophils-Polys 74.70 (H) 44.00 - 72.00 %    Lymphocytes 16.50 (L) 22.00 - 41.00 %    Monocytes 6.30 0.00 - 13.40 %    Eosinophils 1.70 0.00 - 6.90 %    Basophils 0.50 0.00 - 1.80 %    Immature Granulocytes 0.30 0.00 - 0.90 %    Nucleated RBC 0.00 /100 WBC    Neutrophils (Absolute) 4.83 2.00 - 7.15 K/uL    Lymphs (Absolute) 1.07 1.00 - 4.80 K/uL    Monos (Absolute) 0.41 0.00 - 0.85 K/uL    Eos (Absolute) 0.11 0.00 - 0.51 K/uL    Baso (Absolute) 0.03 0.00 - 0.12 K/uL    Immature Granulocytes (abs) 0.02 0.00 - 0.11 K/uL    NRBC (Absolute) 0.00 K/uL   Comp Metabolic Panel    Collection Time: 11/08/21  5:40 PM   Result Value Ref Range    Sodium 137 135 - 145 mmol/L    Potassium 3.4 (L) 3.6 - 5.5 mmol/L    Chloride 100 96 - 112 mmol/L    Co2 23 20 - 33 mmol/L    Anion Gap 14.0 7.0 - 16.0    Glucose 94 65 - 99 mg/dL    Bun 5 (L) 8 - 22 mg/dL    Creatinine 0.73 0.50 - 1.40 mg/dL    Calcium 9.2 8.5 - 10.5 mg/dL    AST(SGOT) 455 (H) 12 - 45 U/L    ALT(SGPT) 352 (H) 2 - 50 U/L    Alkaline Phosphatase 211 (H) 30 - 99 U/L    Total Bilirubin 2.4 (H) 0.1 - 1.5 mg/dL    Albumin 4.5 3.2 - 4.9 g/dL    Total Protein 7.7 6.0 - 8.2 g/dL    Globulin 3.2 1.9 - 3.5  g/dL    A-G Ratio 1.4 g/dL   Prothrombin Time    Collection Time: 11/08/21  5:40 PM   Result Value Ref Range    PT 15.0 (H) 12.0 - 14.6 sec    INR 1.21 (H) 0.87 - 1.13   ESTIMATED GFR    Collection Time: 11/08/21  5:40 PM   Result Value Ref Range    GFR If African American >60 >60 mL/min/1.73 m 2    GFR If Non African American >60 >60 mL/min/1.73 m 2       Imaging/Procedures Review:      CT-ABDOMEN-PELVIS WITH   Final Result         1. Dilated CBD and intrahepatic bile ducts, worse than prior, concerning for distal obstruction. The CBD measures 13 mm.      2. Air-fluid levels throughout the colon, concerning for diarrheal disease or colonic ileus      [id number]   Final Result      1.  Intrahepatic and extrahepatic ductal dilatation. Intrahepatic ducts have a beaded appearance, primarily in the lateral segment of the left hepatic lobe with hepatoliths. Findings may represent primary sclerosing cholangitis or IgG4 related    cholangitis. No pancreatic abnormalities to suggest autoimmune pancreatitis      2.  Common bile duct dilatation with choledocholithiasis.      3.  Pancreatic divisum is incidentally noted.               CT-ABDOMEN-PELVIS WITH   Final Result      1.  No acute inflammatory process in the abdomen or pelvis.   2.  Severe left intrahepatic biliary dilatation, etiology uncertain. Consider MRCP to evaluate for a possible stricture near the confluence of the right and left hepatic ducts.   3.  Mildly dilated CBD. Prior cholecystectomy.           MDM (Assessment and Plan):     Patient Active Problem List    Diagnosis Date Noted   • Constipation 11/08/2021   • Nausea 11/05/2021   • Choledocholithiasis 11/04/2021   • Hypokalemia- resolved 11/04/2021   • Leukocytosis 11/04/2021   • Gastritis 11/02/2021   • Pain in both upper extremities 08/13/2018   • Vertigo 08/13/2018   • Tongue lesion 10/03/2017   • Angioedema 08/30/2017   • Raynaud phenomenon 08/30/2017     This is a very pleasant 37 yo female who  "was admitted to the hospital 11/4/21 with a 1 week history of intermittent RUQ/epigastric abdominal pain radiating to midback with N/V. Evaluated last week at [facility] and was diagnosed with gastritis. Started on a bland diet with some improvement in symptoms until yesterday when she had another episode of severe upper abdominal pain, N/V. Initially, LFT elevated. CT scan revealed severe left intrahepatic biliary dilatation. Mildly dilated CBD. MRCP revealed choledocholithiasis,  Intrahepatic and extrahepatic ductal dilatation. Intrahepatic ducts have a beaded appearance, primarily in the lateral segment of the left hepatic lobe with hepatolith's. After the MRCP, her symptoms improved. LFT's were trending down and no abdominal pain. Deferred ERCP due to improvement in condition. However, continued to have intermittent episodes of \"attacks\" of epigastric pain with nausea, poor po intake. On 11/8/21: LFT's trending up. Repeat CT scan abdomen/pelvis: Dilated CBD and intrahepatic bile ducts, worse than prior, concerning for distal obstruction. The CBD measures 13 mm.  Concern for PSC due to abnormal MrCP, DICK: detected with positive titer: 1:320. AMA: negative. IgG4: normal.    ASSESSMENT:  1. RUQ/epigastric abdominal pain  2. N/V  3. Elevated LFT's    4. Choledocolithiaisis:   5. 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    PLAN:  1. Risk vs benefits of ERCP was discussed with patient and her mother. Risks include heart and lung event secondary to anesthesia> other risks include bleeding, infection, perforation, nicking of bile ducts, pancreatitis.  2. LABS:  CMP tomorrow  3. Recommend clear liquid diet then NPO after midnight  4. IV fluids, antiemetics, pain medication per primary team      Thank your for the opportunity to assist in the care of your patient.  Please call for any questions or concerns.    CRUZ Cobos.     Patient seen, examine and case discussed with Ms " Audra. I agree with note and plan as above. No availability for ERCP in the OR until Thursday.    EMO

## 2021-11-09 NOTE — PROGRESS NOTES
Assumed care of pt. Report received from Israel LEYVA. Pt resting comfortably at this time. Remains NPO pending GI consult.

## 2021-11-10 LAB
ALBUMIN SERPL BCP-MCNC: 4.2 G/DL (ref 3.2–4.9)
ALBUMIN/GLOB SERPL: 1.5 G/DL
ALP SERPL-CCNC: 196 U/L (ref 30–99)
ALT SERPL-CCNC: 238 U/L (ref 2–50)
ANION GAP SERPL CALC-SCNC: 11 MMOL/L (ref 7–16)
AST SERPL-CCNC: 209 U/L (ref 12–45)
BILIRUB SERPL-MCNC: 1.3 MG/DL (ref 0.1–1.5)
BUN SERPL-MCNC: 4 MG/DL (ref 8–22)
CALCIUM SERPL-MCNC: 9.1 MG/DL (ref 8.5–10.5)
CHLORIDE SERPL-SCNC: 102 MMOL/L (ref 96–112)
CO2 SERPL-SCNC: 21 MMOL/L (ref 20–33)
CREAT SERPL-MCNC: 0.61 MG/DL (ref 0.5–1.4)
GLOBULIN SER CALC-MCNC: 2.8 G/DL (ref 1.9–3.5)
GLUCOSE SERPL-MCNC: 81 MG/DL (ref 65–99)
MAGNESIUM SERPL-MCNC: 2 MG/DL (ref 1.5–2.5)
POTASSIUM SERPL-SCNC: 4.2 MMOL/L (ref 3.6–5.5)
PROT SERPL-MCNC: 7 G/DL (ref 6–8.2)
SODIUM SERPL-SCNC: 134 MMOL/L (ref 135–145)

## 2021-11-10 PROCEDURE — 80053 COMPREHEN METABOLIC PANEL: CPT

## 2021-11-10 PROCEDURE — 36415 COLL VENOUS BLD VENIPUNCTURE: CPT

## 2021-11-10 PROCEDURE — 99232 SBSQ HOSP IP/OBS MODERATE 35: CPT | Mod: GC | Performed by: HOSPITALIST

## 2021-11-10 PROCEDURE — 83735 ASSAY OF MAGNESIUM: CPT

## 2021-11-10 PROCEDURE — 770004 HCHG ROOM/CARE - ONCOLOGY PRIVATE *

## 2021-11-10 ASSESSMENT — ENCOUNTER SYMPTOMS
DIARRHEA: 1
DIAPHORESIS: 0
ABDOMINAL PAIN: 1
FEVER: 0
NAUSEA: 0
MUSCULOSKELETAL NEGATIVE: 1
EYES NEGATIVE: 1
SHORTNESS OF BREATH: 0
CARDIOVASCULAR NEGATIVE: 1
CHILLS: 0
VOMITING: 0
BLOOD IN STOOL: 0
HEARTBURN: 0
CONSTIPATION: 0
WEIGHT LOSS: 0
ROS GI COMMENTS: FEELS BLOATED
COUGH: 0
NEUROLOGICAL NEGATIVE: 1
RESPIRATORY NEGATIVE: 1
ABDOMINAL PAIN: 0
PALPITATIONS: 0
PSYCHIATRIC NEGATIVE: 1

## 2021-11-10 ASSESSMENT — PAIN DESCRIPTION - PAIN TYPE: TYPE: ACUTE PAIN

## 2021-11-10 NOTE — PROGRESS NOTES
"Gastroenterology Consult Note:    TODD Cobos  Date & Time note created:    11/10/2021   12:47 PM     Referring MD:  Dr. Juan Luis Madrigal  Patient ID:  Name:             Karina Stern   YOB: 1985  Age:                 36 y.o.  female   MRN:               [id number]                                                             Reason for Consult:      1. Elevated LFT's  2. RUQ pain    History of Present Illness:    This is a 35 yo female PMH angioedema (unknown etiology), mitral valve prolapse, Raynauds who was admitted to the hospital 11/4/21 with a 1 week history of intermittent RUQ/epigastric abdominal pain radiating to back, N/V. First episode was last week, seen at [facility] and was diagnosed with gastritis. Discharged home on a bland diet with some improvement in symptoms but had another episode of similar pain yesterday. Abnormal ER labs revealed WBC 12.2. Potassium 3.5. Glucose 117. AST: 210. ALT: 110. ALP: 128. TB: 1.0.     CT scan abdomen/pelvis: Severe left intrahepatic biliary dilatation, etiology uncertain. Consider MRCP to evaluate for a possible stricture near the confluence of the right and left hepatic ducts. Mildly dilated CBD. Prior cholecystectomy.    MRCP:   1.  Intrahepatic and extrahepatic ductal dilatation. Intrahepatic ducts have a beaded appearance, primarily in the lateral segment of the left hepatic lobe with hepatoliths. Findings may represent primary sclerosing cholangitis or IgG4 related   cholangitis. No pancreatic abnormalities to suggest autoimmune pancreatitis   2.  Common bile duct dilatation with choledocholithiasis.   3.  Pancreatic divisum is incidentally noted.    Initially, the abdominal pain improved. Her LFT's were trending down. Felt that the stone passed so ERCP was deferred due to improvement in condition. However, she continued to have intermittent episodes of \"full blown attack\" abdominal pain, Nausea with dry heeves. She has not been " tolerating po intake. Yesterday, Her LFT's started to rise.     Labs 11/8/21: Total bilirubin: 2.4 (0.3 on 11/6/21). ALP: 211 (71 on 11/6/21). AST: 455 (18 on 11/6/21). ALT: 352 (38 on 11/6/21). DICK detected with positive titer: 1:320. AMA: 2.4 (N). IgG4: 57 (normal).    CT scan abdomen/pelvis 11/8/21: Dilated CBD and intrahepatic bile ducts, worse than prior, concerning for distal obstruction. The CBD measures 13 mm.      Air-fluid levels throughout the colon, concerning for diarrheal disease or colonic ileus    Denies history of inflammatory bowel disease or autoimmune disease. Mother diagnosed with myasthenia gravis and fibromyalgia.    INTERVAL HISTORY:    11/10/21: Attempted a regular diet last night but developed abdominal fullness and pressure. No N/V. LFT's trending down: TB: 1.3 (2.4 on 11/8/21. AST: 209. ALT: 238. ALP: 196. Able to tolerate clear liquids without any difficulties. Plan for ERCP tomorrow.     Review of Systems:      Review of Systems   Constitutional: Positive for malaise/fatigue.   HENT: Negative.    Eyes: Negative.    Respiratory: Negative.    Cardiovascular: Negative.    Gastrointestinal: Positive for abdominal pain. Negative for nausea.   Genitourinary: Negative.    Musculoskeletal: Negative.    Skin: Negative.    Neurological: Negative.    Psychiatric/Behavioral: Negative.              Physical Exam:  Vitals/ General Appearance:   Weight/BMI: Body mass index is 26.17 kg/m².    Vitals:    11/09/21 1523 11/09/21 2033 11/10/21 0616 11/10/21 0838   BP: 106/53 109/57 109/58 108/55   Pulse: 62 69 70 62   Resp: 16 16 16 18   Temp: 37.1 °C (98.8 °F) 36.4 °C (97.6 °F) 36.9 °C (98.4 °F) 36.7 °C (98 °F)   TempSrc: Temporal Oral Oral Temporal   SpO2: 97% 97% 92% 97%   Weight:       Height:         Oxygen Therapy:  Pulse Oximetry: 97 %, O2 (LPM): 0, O2 Delivery Device: None - Room Air    Physical Exam  Vitals and nursing note reviewed.   Constitutional:       Appearance: Normal appearance.   HENT:       Head: Normocephalic and atraumatic.      Right Ear: Tympanic membrane normal.      Left Ear: Tympanic membrane normal.      Mouth/Throat:      Mouth: Mucous membranes are dry.   Eyes:      General: No scleral icterus.     Extraocular Movements: Extraocular movements intact.      Pupils: Pupils are equal, round, and reactive to light.   Cardiovascular:      Rate and Rhythm: Normal rate and regular rhythm.      Pulses: Normal pulses.      Heart sounds: Normal heart sounds.   Pulmonary:      Effort: Pulmonary effort is normal.      Breath sounds: Normal breath sounds.   Abdominal:      General: Abdomen is flat. Bowel sounds are normal.      Palpations: Abdomen is soft.      Tenderness: There is abdominal tenderness (epigastric/RUQ).   Musculoskeletal:         General: Normal range of motion.      Cervical back: Normal range of motion and neck supple.   Skin:     General: Skin is warm and dry.      Capillary Refill: Capillary refill takes less than 2 seconds.      Coloration: Skin is not jaundiced.   Neurological:      General: No focal deficit present.      Mental Status: She is alert and oriented to person, place, and time.   Psychiatric:         Mood and Affect: Mood normal.         Behavior: Behavior normal.         Thought Content: Thought content normal.         Judgment: Judgment normal.         Past Medical History:   History reviewed. No pertinent past medical history.    Past Surgical History:  Past Surgical History:   Procedure Laterality Date   • CHOLECYSTECTOMY     • ERCP     • PRIMARY C SECTION         Hospital Medications:    Current Facility-Administered Medications:   •  senna-docusate (PERICOLACE or SENOKOT S) 8.6-50 MG per tablet 2 Tablet, 2 Tablet, Oral, BID, 2 Tablet at 11/08/21 0634 **AND** polyethylene glycol/lytes (MIRALAX) PACKET 1 Packet, 1 Packet, Oral, DAILY, 1 Packet at 11/08/21 0634 **AND** magnesium hydroxide (MILK OF MAGNESIA) suspension 30 mL, 30 mL, Oral, QDAY PRN **AND**  [DISCONTINUED] bisacodyl (DULCOLAX) suppository 10 mg, 10 mg, Rectal, QDAY PRN, Stefano Jacobsen D.O.  •  acetaminophen (Tylenol) tablet 650 mg, 650 mg, Oral, Q6HRS PRN, Rudi Galvan M.D., 650 mg at 11/09/21 2033  •  ondansetron (ZOFRAN) syringe/vial injection 4 mg, 4 mg, Intravenous, Q4HRS PRN, Stefano Jacobsen D.O., 4 mg at 11/05/21 1934  •  ondansetron (ZOFRAN ODT) dispertab 4 mg, 4 mg, Oral, Q4HRS PRN, Stefano Jacobsen D.O., 4 mg at 11/08/21 1535  •  promethazine (PHENERGAN) tablet 12.5-25 mg, 12.5-25 mg, Oral, Q4HRS PRN, Rudi Galvan M.D., 25 mg at 11/07/21 2233  •  promethazine (PHENERGAN) suppository 12.5-25 mg, 12.5-25 mg, Rectal, Q4HRS PRN, Rudi Galvan M.D.  •  oxyCODONE immediate-release (ROXICODONE) tablet 5 mg, 5 mg, Oral, Q4HRS PRN, Rudi Galvan M.D., 5 mg at 11/08/21 0634    Current Outpatient Medications:  Current Facility-Administered Medications   Medication Dose Route Frequency Provider Last Rate Last Admin   • polyethylene glycol/lytes (MIRALAX) PACKET 1 Packet  1 Packet Oral DAILY Stefano Jacobsen D.O.   1 Packet at 11/08/21 0634    And   • senna-docusate (PERICOLACE or SENOKOT S) 8.6-50 MG per tablet 2 Tablet  2 Tablet Oral BID Stefano Jacobsen D.O.   2 Tablet at 11/08/21 0634    And   • magnesium hydroxide (MILK OF MAGNESIA) suspension 30 mL  30 mL Oral QDAY PRN Stefano Jacobsen D.O.       • acetaminophen (Tylenol) tablet 650 mg  650 mg Oral Q6HRS PRN Rudi Galvan M.D.   650 mg at 11/09/21 2033   • ondansetron (ZOFRAN) syringe/vial injection 4 mg  4 mg Intravenous Q4HRS PRN Stefano Jacobsen D.O.   4 mg at 11/05/21 1934   • ondansetron (ZOFRAN ODT) dispertab 4 mg  4 mg Oral Q4HRS PRN Stefano Jacobsen D.O.   4 mg at 11/08/21 1535   • promethazine (PHENERGAN) tablet 12.5-25 mg  12.5-25 mg Oral Q4HRS PRN Rudi Galvan M.D.   25 mg at 11/07/21 2233   • promethazine (PHENERGAN) suppository 12.5-25 mg  12.5-25 mg Rectal Q4HRS PRN Rudi SCHAFFER  ARLEEN Galvan       • oxyCODONE immediate-release (ROXICODONE) tablet 5 mg  5 mg Oral Q4HRS PRN Rudi Galvan M.D.   5 mg at 11/08/21 0634       Medication Allergy:  Allergies   Allergen Reactions   • Cantaloupe      Itchy throat   • Dilaudid [Hydromorphone Hcl]      Patient doesn't specifically recall event, thinks she possibly stopped breathing. Has tolerated oxycodone, hydrocodone, and morphine historically without issues.        Family History:  History reviewed. No pertinent family history.    Social History:  Social History     Socioeconomic History   • Marital status: Single     Spouse name: Not on file   • Number of children: Not on file   • Years of education: Not on file   • Highest education level: Not on file   Occupational History   • Not on file   Tobacco Use   • Smoking status: Never Smoker   • Smokeless tobacco: Never Used   Substance and Sexual Activity   • Alcohol use: No   • Drug use: No   • Sexual activity: Not on file   Other Topics Concern   • Not on file   Social History Narrative   • Not on file     Social Determinants of Health     Financial Resource Strain:    • Difficulty of Paying Living Expenses: Not on file   Food Insecurity:    • Worried About Running Out of Food in the Last Year: Not on file   • Ran Out of Food in the Last Year: Not on file   Transportation Needs:    • Lack of Transportation (Medical): Not on file   • Lack of Transportation (Non-Medical): Not on file   Physical Activity:    • Days of Exercise per Week: Not on file   • Minutes of Exercise per Session: Not on file   Stress:    • Feeling of Stress : Not on file   Social Connections:    • Frequency of Communication with Friends and Family: Not on file   • Frequency of Social Gatherings with Friends and Family: Not on file   • Attends Hoahaoism Services: Not on file   • Active Member of Clubs or Organizations: Not on file   • Attends Club or Organization Meetings: Not on file   • Marital Status: Not on file    Intimate Partner Violence:    • Fear of Current or Ex-Partner: Not on file   • Emotionally Abused: Not on file   • Physically Abused: Not on file   • Sexually Abused: Not on file   Housing Stability:    • Unable to Pay for Housing in the Last Year: Not on file   • Number of Places Lived in the Last Year: Not on file   • Unstable Housing in the Last Year: Not on file         MDM (Data Review):     Records reviewed and summarized in current documentation    Lab Data Review:  Recent Results (from the past 24 hour(s))   MAGNESIUM    Collection Time: 11/09/21  1:30 PM   Result Value Ref Range    Magnesium 2.1 1.5 - 2.5 mg/dL   LIPASE    Collection Time: 11/09/21  1:30 PM   Result Value Ref Range    Lipase 22 11 - 82 U/L   Comp Metabolic Panel    Collection Time: 11/10/21 12:32 AM   Result Value Ref Range    Sodium 134 (L) 135 - 145 mmol/L    Potassium 4.2 3.6 - 5.5 mmol/L    Chloride 102 96 - 112 mmol/L    Co2 21 20 - 33 mmol/L    Anion Gap 11.0 7.0 - 16.0    Glucose 81 65 - 99 mg/dL    Bun 4 (L) 8 - 22 mg/dL    Creatinine 0.61 0.50 - 1.40 mg/dL    Calcium 9.1 8.5 - 10.5 mg/dL    AST(SGOT) 209 (H) 12 - 45 U/L    ALT(SGPT) 238 (H) 2 - 50 U/L    Alkaline Phosphatase 196 (H) 30 - 99 U/L    Total Bilirubin 1.3 0.1 - 1.5 mg/dL    Albumin 4.2 3.2 - 4.9 g/dL    Total Protein 7.0 6.0 - 8.2 g/dL    Globulin 2.8 1.9 - 3.5 g/dL    A-G Ratio 1.5 g/dL   MAGNESIUM    Collection Time: 11/10/21 12:32 AM   Result Value Ref Range    Magnesium 2.0 1.5 - 2.5 mg/dL   ESTIMATED GFR    Collection Time: 11/10/21 12:32 AM   Result Value Ref Range    GFR If African American >60 >60 mL/min/1.73 m 2    GFR If Non African American >60 >60 mL/min/1.73 m 2       Imaging/Procedures Review:      CT-ABDOMEN-PELVIS WITH   Final Result         1. Dilated CBD and intrahepatic bile ducts, worse than prior, concerning for distal obstruction. The CBD measures 13 mm.      2. Air-fluid levels throughout the colon, concerning for diarrheal disease or colonic  ileus      [id number]   Final Result      1.  Intrahepatic and extrahepatic ductal dilatation. Intrahepatic ducts have a beaded appearance, primarily in the lateral segment of the left hepatic lobe with hepatoliths. Findings may represent primary sclerosing cholangitis or IgG4 related    cholangitis. No pancreatic abnormalities to suggest autoimmune pancreatitis      2.  Common bile duct dilatation with choledocholithiasis.      3.  Pancreatic divisum is incidentally noted.               CT-ABDOMEN-PELVIS WITH   Final Result      1.  No acute inflammatory process in the abdomen or pelvis.   2.  Severe left intrahepatic biliary dilatation, etiology uncertain. Consider MRCP to evaluate for a possible stricture near the confluence of the right and left hepatic ducts.   3.  Mildly dilated CBD. Prior cholecystectomy.           MDM (Assessment and Plan):     Patient Active Problem List    Diagnosis Date Noted   • Constipation 11/08/2021   • Nausea 11/05/2021   • Choledocholithiasis 11/04/2021   • Hypokalemia 11/04/2021   • Leukocytosis 11/04/2021   • Gastritis 11/02/2021   • Pain in both upper extremities 08/13/2018   • Vertigo 08/13/2018   • Tongue lesion 10/03/2017   • Angioedema 08/30/2017   • Raynaud phenomenon 08/30/2017     This is a very pleasant 37 yo female who was admitted to the hospital 11/4/21 with a 1 week history of intermittent RUQ/epigastric abdominal pain radiating to midback with N/V. Evaluated last week at [facility] and was diagnosed with gastritis. Started on a bland diet with some improvement in symptoms until yesterday when she had another episode of severe upper abdominal pain, N/V. Initially, LFT elevated. CT scan revealed severe left intrahepatic biliary dilatation. Mildly dilated CBD. MRCP revealed choledocholithiasis,  Intrahepatic and extrahepatic ductal dilatation. Intrahepatic ducts have a beaded appearance, primarily in the lateral segment of the left hepatic lobe with hepatolith's.  "After the MRCP, her symptoms improved. LFT's were trending down and no abdominal pain. Deferred ERCP due to improvement in condition. However, continued to have intermittent episodes of \"attacks\" of epigastric pain with nausea, poor po intake. On 11/8/21: LFT's trending up. Repeat CT scan abdomen/pelvis: Dilated CBD and intrahepatic bile ducts, worse than prior, concerning for distal obstruction. The CBD measures 13 mm.  Concern for PSC due to abnormal MrCP, DICK: detected with positive titer: 1:320. AMA: negative. IgG4: normal.    ASSESSMENT:  1. RUQ/epigastric abdominal pain  2. N/V  3. Elevated LFT's    4. Choledocolithiaisis:   5. 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 Positive DICK with titer 1:320.    PLAN:  1. The risk and benefits of the procedure, including but not limited to bleeding, perforation, infection, cholangitis, missed lesions, pancreatitis mild to severe, prolonged hospitalization, need for surgery were all discussed with the patient and she verbalizes understanding.  She wishes to proceed forward with the plan of care. Plan for ERCP 11/11/21 at 9::30 AM  2. LABS:  CMP tomorrow  3. Recommend clear liquid diet then NPO after midnight  4. IV fluids, antiemetics, pain medication per primary team      Thank your for the opportunity to assist in the care of your patient.  Please call for any questions or concerns.    CRUZ Cobos.                 "

## 2021-11-10 NOTE — CARE PLAN
The patient is Stable - Low risk of patient condition declining or worsening    Shift Goals  Clinical Goals: Tolerate PO   Patient Goals: Pain/Nausea Control     Progress made toward(s) clinical / shift goals:        Problem: Pain - Standard  Goal: Alleviation of pain or a reduction in pain to the patient’s comfort goal  Outcome: Progressing  Note: Complaints of 3/10 abdominal pain. PRN tylenol given with adequate relief. Oral hydration encouraged.      Problem: Knowledge Deficit - Standard  Goal: Patient and family/care givers will demonstrate understanding of plan of care, disease process/condition, diagnostic tests and medications  Outcome: Progressing  Note: POC discussed with patient. All questions and concerns addressed during this time.        Patient is not progressing towards the following goals:

## 2021-11-10 NOTE — CARE PLAN
The patient is Stable - Low risk of patient condition declining or worsening    Shift Goals  Clinical Goals: Tolerate PO   Patient Goals: Pain/Nausea Control     Progress made toward(s) clinical / shift goals:    Problem: Pain - Standard  Goal: Alleviation of pain or a reduction in pain to the patient’s comfort goal  Outcome: Progressing     Problem: Knowledge Deficit - Standard  Goal: Patient and family/care givers will demonstrate understanding of plan of care, disease process/condition, diagnostic tests and medications  Outcome: Progressing     Problem: Bowel Elimination  Goal: Establish and maintain regular bowel function  Outcome: Progressing     Problem: Gastrointestinal Irritability  Goal: Nausea and vomiting will be absent or improve  Outcome: Progressing  Goal: Diarrhea will be absent or improved  Outcome: Progressing       Patient is not progressing towards the following goals:

## 2021-11-11 ENCOUNTER — APPOINTMENT (OUTPATIENT)
Dept: RADIOLOGY | Facility: MEDICAL CENTER | Age: 36
DRG: 445 | End: 2021-11-11
Attending: INTERNAL MEDICINE
Payer: COMMERCIAL

## 2021-11-11 ENCOUNTER — ANESTHESIA EVENT (OUTPATIENT)
Dept: SURGERY | Facility: MEDICAL CENTER | Age: 36
DRG: 445 | End: 2021-11-11
Payer: COMMERCIAL

## 2021-11-11 ENCOUNTER — ANESTHESIA (OUTPATIENT)
Dept: SURGERY | Facility: MEDICAL CENTER | Age: 36
DRG: 445 | End: 2021-11-11
Payer: COMMERCIAL

## 2021-11-11 LAB
ANION GAP SERPL CALC-SCNC: 13 MMOL/L (ref 7–16)
BUN SERPL-MCNC: 4 MG/DL (ref 8–22)
CALCIUM SERPL-MCNC: 9.4 MG/DL (ref 8.5–10.5)
CHLORIDE SERPL-SCNC: 103 MMOL/L (ref 96–112)
CO2 SERPL-SCNC: 21 MMOL/L (ref 20–33)
CREAT SERPL-MCNC: 0.53 MG/DL (ref 0.5–1.4)
GLUCOSE SERPL-MCNC: 85 MG/DL (ref 65–99)
HCG UR QL: NEGATIVE
MAGNESIUM SERPL-MCNC: 2.1 MG/DL (ref 1.5–2.5)
POTASSIUM SERPL-SCNC: 4 MMOL/L (ref 3.6–5.5)
SODIUM SERPL-SCNC: 137 MMOL/L (ref 135–145)

## 2021-11-11 PROCEDURE — 160035 HCHG PACU - 1ST 60 MINS PHASE I: Performed by: INTERNAL MEDICINE

## 2021-11-11 PROCEDURE — 160048 HCHG OR STATISTICAL LEVEL 1-5: Performed by: INTERNAL MEDICINE

## 2021-11-11 PROCEDURE — 160203 HCHG ENDO MINUTES - 1ST 30 MINS LEVEL 4: Performed by: INTERNAL MEDICINE

## 2021-11-11 PROCEDURE — 36415 COLL VENOUS BLD VENIPUNCTURE: CPT

## 2021-11-11 PROCEDURE — 80048 BASIC METABOLIC PNL TOTAL CA: CPT

## 2021-11-11 PROCEDURE — 99232 SBSQ HOSP IP/OBS MODERATE 35: CPT | Mod: GC | Performed by: HOSPITALIST

## 2021-11-11 PROCEDURE — 700111 HCHG RX REV CODE 636 W/ 250 OVERRIDE (IP): Performed by: ANESTHESIOLOGY

## 2021-11-11 PROCEDURE — 770004 HCHG ROOM/CARE - ONCOLOGY PRIVATE *

## 2021-11-11 PROCEDURE — 110371 HCHG SHELL REV 272: Performed by: INTERNAL MEDICINE

## 2021-11-11 PROCEDURE — 160002 HCHG RECOVERY MINUTES (STAT): Performed by: INTERNAL MEDICINE

## 2021-11-11 PROCEDURE — 160009 HCHG ANES TIME/MIN: Performed by: INTERNAL MEDICINE

## 2021-11-11 PROCEDURE — 74328 X-RAY BILE DUCT ENDOSCOPY: CPT

## 2021-11-11 PROCEDURE — 700117 HCHG RX CONTRAST REV CODE 255: Performed by: INTERNAL MEDICINE

## 2021-11-11 PROCEDURE — 83735 ASSAY OF MAGNESIUM: CPT

## 2021-11-11 PROCEDURE — 700105 HCHG RX REV CODE 258: Performed by: ANESTHESIOLOGY

## 2021-11-11 PROCEDURE — 700101 HCHG RX REV CODE 250: Performed by: ANESTHESIOLOGY

## 2021-11-11 PROCEDURE — 0FC98ZZ EXTIRPATION OF MATTER FROM COMMON BILE DUCT, VIA NATURAL OR ARTIFICIAL OPENING ENDOSCOPIC: ICD-10-PCS | Performed by: INTERNAL MEDICINE

## 2021-11-11 PROCEDURE — 81025 URINE PREGNANCY TEST: CPT

## 2021-11-11 RX ORDER — MIDAZOLAM HYDROCHLORIDE 1 MG/ML
1 INJECTION INTRAMUSCULAR; INTRAVENOUS
Status: DISCONTINUED | OUTPATIENT
Start: 2021-11-11 | End: 2021-11-11 | Stop reason: HOSPADM

## 2021-11-11 RX ORDER — ONDANSETRON 2 MG/ML
INJECTION INTRAMUSCULAR; INTRAVENOUS PRN
Status: DISCONTINUED | OUTPATIENT
Start: 2021-11-11 | End: 2021-11-11 | Stop reason: SURG

## 2021-11-11 RX ORDER — CEFAZOLIN SODIUM 1 G/3ML
INJECTION, POWDER, FOR SOLUTION INTRAMUSCULAR; INTRAVENOUS PRN
Status: DISCONTINUED | OUTPATIENT
Start: 2021-11-11 | End: 2021-11-11 | Stop reason: SURG

## 2021-11-11 RX ORDER — SUCCINYLCHOLINE CHLORIDE 20 MG/ML
INJECTION INTRAMUSCULAR; INTRAVENOUS PRN
Status: DISCONTINUED | OUTPATIENT
Start: 2021-11-11 | End: 2021-11-11 | Stop reason: SURG

## 2021-11-11 RX ORDER — DEXAMETHASONE SODIUM PHOSPHATE 4 MG/ML
INJECTION, SOLUTION INTRA-ARTICULAR; INTRALESIONAL; INTRAMUSCULAR; INTRAVENOUS; SOFT TISSUE PRN
Status: DISCONTINUED | OUTPATIENT
Start: 2021-11-11 | End: 2021-11-11 | Stop reason: SURG

## 2021-11-11 RX ORDER — DIPHENHYDRAMINE HYDROCHLORIDE 50 MG/ML
12.5 INJECTION INTRAMUSCULAR; INTRAVENOUS
Status: DISCONTINUED | OUTPATIENT
Start: 2021-11-11 | End: 2021-11-11 | Stop reason: HOSPADM

## 2021-11-11 RX ORDER — ROCURONIUM BROMIDE 10 MG/ML
INJECTION, SOLUTION INTRAVENOUS PRN
Status: DISCONTINUED | OUTPATIENT
Start: 2021-11-11 | End: 2021-11-11 | Stop reason: SURG

## 2021-11-11 RX ORDER — OXYCODONE HCL 5 MG/5 ML
5 SOLUTION, ORAL ORAL
Status: DISCONTINUED | OUTPATIENT
Start: 2021-11-11 | End: 2021-11-11 | Stop reason: HOSPADM

## 2021-11-11 RX ORDER — SODIUM CHLORIDE, SODIUM LACTATE, POTASSIUM CHLORIDE, CALCIUM CHLORIDE 600; 310; 30; 20 MG/100ML; MG/100ML; MG/100ML; MG/100ML
INJECTION, SOLUTION INTRAVENOUS
Status: DISCONTINUED | OUTPATIENT
Start: 2021-11-11 | End: 2021-11-11 | Stop reason: SURG

## 2021-11-11 RX ORDER — LIDOCAINE HYDROCHLORIDE 20 MG/ML
INJECTION, SOLUTION EPIDURAL; INFILTRATION; INTRACAUDAL; PERINEURAL PRN
Status: DISCONTINUED | OUTPATIENT
Start: 2021-11-11 | End: 2021-11-11 | Stop reason: SURG

## 2021-11-11 RX ORDER — LABETALOL HYDROCHLORIDE 5 MG/ML
5 INJECTION, SOLUTION INTRAVENOUS
Status: DISCONTINUED | OUTPATIENT
Start: 2021-11-11 | End: 2021-11-11 | Stop reason: HOSPADM

## 2021-11-11 RX ORDER — ONDANSETRON 2 MG/ML
4 INJECTION INTRAMUSCULAR; INTRAVENOUS
Status: COMPLETED | OUTPATIENT
Start: 2021-11-11 | End: 2021-11-11

## 2021-11-11 RX ORDER — SODIUM CHLORIDE, SODIUM LACTATE, POTASSIUM CHLORIDE, CALCIUM CHLORIDE 600; 310; 30; 20 MG/100ML; MG/100ML; MG/100ML; MG/100ML
INJECTION, SOLUTION INTRAVENOUS CONTINUOUS
Status: DISCONTINUED | OUTPATIENT
Start: 2021-11-11 | End: 2021-11-11 | Stop reason: HOSPADM

## 2021-11-11 RX ORDER — MEPERIDINE HYDROCHLORIDE 25 MG/ML
12.5 INJECTION INTRAMUSCULAR; INTRAVENOUS; SUBCUTANEOUS
Status: DISCONTINUED | OUTPATIENT
Start: 2021-11-11 | End: 2021-11-11 | Stop reason: HOSPADM

## 2021-11-11 RX ORDER — OXYCODONE HCL 5 MG/5 ML
10 SOLUTION, ORAL ORAL
Status: DISCONTINUED | OUTPATIENT
Start: 2021-11-11 | End: 2021-11-11 | Stop reason: HOSPADM

## 2021-11-11 RX ORDER — HALOPERIDOL 5 MG/ML
1 INJECTION INTRAMUSCULAR
Status: DISCONTINUED | OUTPATIENT
Start: 2021-11-11 | End: 2021-11-11 | Stop reason: HOSPADM

## 2021-11-11 RX ORDER — MAGNESIUM SULFATE HEPTAHYDRATE 500 MG/ML
INJECTION, SOLUTION INTRAMUSCULAR; INTRAVENOUS PRN
Status: DISCONTINUED | OUTPATIENT
Start: 2021-11-11 | End: 2021-11-11 | Stop reason: SURG

## 2021-11-11 RX ADMIN — SODIUM CHLORIDE, POTASSIUM CHLORIDE, SODIUM LACTATE AND CALCIUM CHLORIDE: 600; 310; 30; 20 INJECTION, SOLUTION INTRAVENOUS at 09:42

## 2021-11-11 RX ADMIN — DIPHENHYDRAMINE HYDROCHLORIDE 12.5 MG: 50 INJECTION INTRAMUSCULAR; INTRAVENOUS at 10:24

## 2021-11-11 RX ADMIN — ROCURONIUM BROMIDE 5 MG: 10 INJECTION, SOLUTION INTRAVENOUS at 09:44

## 2021-11-11 RX ADMIN — PROPOFOL 140 MG: 10 INJECTION, EMULSION INTRAVENOUS at 09:46

## 2021-11-11 RX ADMIN — DEXAMETHASONE SODIUM PHOSPHATE 8 MG: 4 INJECTION, SOLUTION INTRA-ARTICULAR; INTRALESIONAL; INTRAMUSCULAR; INTRAVENOUS; SOFT TISSUE at 09:46

## 2021-11-11 RX ADMIN — CEFAZOLIN 2 G: 330 INJECTION, POWDER, FOR SOLUTION INTRAMUSCULAR; INTRAVENOUS at 09:47

## 2021-11-11 RX ADMIN — MAGNESIUM SULFATE HEPTAHYDRATE 2 G: 500 INJECTION, SOLUTION INTRAMUSCULAR; INTRAVENOUS at 09:46

## 2021-11-11 RX ADMIN — ONDANSETRON 4 MG: 2 INJECTION INTRAMUSCULAR; INTRAVENOUS at 10:09

## 2021-11-11 RX ADMIN — ONDANSETRON 4 MG: 2 INJECTION INTRAMUSCULAR; INTRAVENOUS at 09:46

## 2021-11-11 RX ADMIN — LIDOCAINE HYDROCHLORIDE 100 MG: 20 INJECTION, SOLUTION EPIDURAL; INFILTRATION; INTRACAUDAL at 09:46

## 2021-11-11 RX ADMIN — SUCCINYLCHOLINE CHLORIDE 100 MG: 20 INJECTION, SOLUTION INTRAMUSCULAR; INTRAVENOUS; PARENTERAL at 09:46

## 2021-11-11 ASSESSMENT — ENCOUNTER SYMPTOMS
HEARTBURN: 0
CHILLS: 0
ABDOMINAL PAIN: 0
NAUSEA: 0
PSYCHIATRIC NEGATIVE: 1
RESPIRATORY NEGATIVE: 1
PALPITATIONS: 0
VOMITING: 0
EYES NEGATIVE: 1
WEIGHT LOSS: 0
FEVER: 0
DIARRHEA: 0
SHORTNESS OF BREATH: 0
CONSTIPATION: 0
DIAPHORESIS: 0
NEUROLOGICAL NEGATIVE: 1
MUSCULOSKELETAL NEGATIVE: 1
COUGH: 0
BLOOD IN STOOL: 0

## 2021-11-11 ASSESSMENT — PAIN DESCRIPTION - PAIN TYPE: TYPE: ACUTE PAIN

## 2021-11-11 ASSESSMENT — PAIN SCALES - GENERAL: PAIN_LEVEL: 0

## 2021-11-11 NOTE — OR NURSING
Telephone report received from [facility] on CNU for pt procedure in [facility] Same Day today. Updated RN on need for updated HCG. Order placed.

## 2021-11-11 NOTE — ANESTHESIA PROCEDURE NOTES
Airway    Date/Time: 11/11/2021 9:47 AM  Performed by: Kita Moe M.D.  Authorized by: Kita Moe M.D.     Location:  OR  Urgency:  Elective  Indications for Airway Management:  Anesthesia      Spontaneous Ventilation: absent    Sedation Level:  Deep  Preoxygenated: Yes    Mask Difficulty Assessment:  1 - vent by mask  Final Airway Type:  Endotracheal airway  Final Endotracheal Airway:  ETT  Cuffed: Yes    Technique Used for Successful ETT Placement:  Direct laryngoscopy    Insertion Site:  Oral  Blade Type:  Fajardo  Laryngoscope Blade/Videolaryngoscope Blade Size:  2  ETT Size (mm):  7.0  Leak Pressue (cm H2O):  21  Measured from:  Lips  Placement Verified by: capnometry    Cormack-Lehane Classification:  Grade I - full view of glottis  Number of Attempts at Approach:  1

## 2021-11-11 NOTE — PROGRESS NOTES
Daily Progress Note:     Date of Service: 11/10/2021  Primary Team: [facility] Green Team   Attending: Juan Luis Madrigal M.D.   Senior Resident: Dr. Tyler Floyd  Intern: Dr. Ce Cole  Contact:  [phone]    Chief Complaint:  Abdominal Pain    Patient ID/Summary: Patient is a 36 year old female with a past medical history of cholecystectomy, angioedema of unknown origin, Raynaud phenomenon and mitral valve prolapse. She presented on 11/4/21 after a 1 week history of intermittent right upper quadrant pain that radiated to the back. Imaging indicated cholelithiasis and she was admitted. GI was consulted and ERCP was deferred due to initial improving transmamitinits and clinical symptoms.   She had increased abdominal pain and was not tolerating PO intake as of 11/8. A repeat CT was performed, concern for colonic ileus was found on imaging and as well as a 13 mm dilation of the common bile duct.   ERCP by GI 11/11.       Subjective: Patient has stated that she hasn't eaten much. She has not had any nausea or vomiting, but does endorse feeling bloated. She does not endorse any abdominal pain currently. She denies any fever or chills. She has had one bowel movement yesterday and reports that it was diarrhea.     Consultants/Specialty:  Gastroenterology      Review of Systems:    Review of Systems   Constitutional: Negative for chills, diaphoresis, fever, malaise/fatigue and weight loss.   HENT: Negative.  Negative for congestion, ear pain, hearing loss, nosebleeds and tinnitus.    Eyes: Negative.    Respiratory: Negative.  Negative for cough and shortness of breath.    Cardiovascular: Negative for chest pain, palpitations and leg swelling.   Gastrointestinal: Positive for diarrhea (Yesterday evening). Negative for abdominal pain, blood in stool, constipation, heartburn, nausea and vomiting.        Feels bloated   Genitourinary: Negative.    Musculoskeletal: Negative.    Skin: Negative for itching and rash.    Neurological: Negative.    Endo/Heme/Allergies: Negative.    Psychiatric/Behavioral: Negative.        Objective Data:      Vitals:   Temp:  [36.4 °C (97.6 °F)-36.9 °C (98.4 °F)] 36.7 °C (98.1 °F)  Pulse:  [62-70] 62  Resp:  [16-18] 16  BP: ()/(55-58) 98/55  SpO2:  [92 %-98 %] 98 %      Physical Exam  Constitutional:       General: She is not in acute distress.     Appearance: Normal appearance. She is not ill-appearing, toxic-appearing or diaphoretic.      Comments: Patient is sitting up in bed, she is in no acute distress, but looks weak and tired.    HENT:      Head: Normocephalic and atraumatic.      Nose: Nose normal.      Mouth/Throat:      Mouth: Mucous membranes are moist.      Pharynx: Oropharynx is clear. No oropharyngeal exudate or posterior oropharyngeal erythema.   Eyes:      General: No scleral icterus.        Right eye: No discharge.         Left eye: No discharge.      Extraocular Movements: Extraocular movements intact.      Conjunctiva/sclera: Conjunctivae normal.   Cardiovascular:      Rate and Rhythm: Normal rate and regular rhythm.      Pulses: Normal pulses.      Heart sounds: Normal heart sounds. No murmur heard.  No friction rub. No gallop.    Pulmonary:      Effort: Pulmonary effort is normal. No respiratory distress.      Breath sounds: Normal breath sounds. No stridor. No wheezing, rhonchi or rales.   Chest:      Chest wall: No tenderness.   Abdominal:      General: Abdomen is flat. A surgical scar is present. Bowel sounds are decreased. There is no distension.      Tenderness: There is abdominal tenderness in the epigastric area. There is no guarding or rebound.      Hernia: No hernia is present.      Comments: Patient reports feeling pressure with palpation, no pain.    Musculoskeletal:      Cervical back: Normal range of motion. No rigidity or tenderness.   Neurological:      Mental Status: She is alert.           Labs:   Recent Labs     11/08/21  1740 11/10/21  0032   SODIUM 137  134*   POTASSIUM 3.4* 4.2   CHLORIDE 100 102   CO2 23 21   GLUCOSE 94 81   BUN 5* 4*     Recent Labs     11/08/21  1740 11/10/21  0032   ALBUMIN 4.5 4.2   TBILIRUBIN 2.4* 1.3   ALKPHOSPHAT 211* 196*   TOTPROTEIN 7.7 7.0   ALTSGPT 352* 238*   ASTSGOT 455* 209*   CREATININE 0.73 0.61     Recent Labs     11/08/21  1740   WBC 6.5   RBC 4.47   HEMOGLOBIN 13.0   HEMATOCRIT 40.8   MCV 91.3   MCH 29.1   RDW 41.8   PLATELETCT 268   MPV 12.1   NEUTSPOLYS 74.70*   LYMPHOCYTES 16.50*   MONOCYTES 6.30   EOSINOPHILS 1.70   BASOPHILS 0.50     Lipase: 22      Imaging:     CT 11/8/2021: Dilated CBD and intrahepatic bile ducts, worsening. CBD 13 mm.   Air fluid levels in colon, concern for colonic ileus or diarrheal disease    Problem Representation:     * Choledocholithiasis  Assessment & Plan  Assessment:   Acute abdominal pain, history of cholecystectomy in 2011 for cholelithiasis. CT abd revealed L intrahepatic duct dilation, concerning for stricture. Liver enzymes consistent with acute intrahepatic etiology. Has had multiple episodes of emesis (x6, nonbloody). Very unlikely to be obstruction due to cholelithiasis. MRCP consistent with cholelithiasis.   GI consulted and due to improved abdominal pain and downtrending AST (18 from 210) and ALT (38 from 110). Current plan defer ERCP as stone seems to have passed and will need follow up outpatient.  11/8: CT showed evidence of dilated CBD, re-consulted GI. Elevated LFTs, indicating inflammatory process, and/or obstruction.   Continues to have bloating sensation, pressure with palpation.      Plan:  - Full liquid diet advance, NPO midnight  - Attempt to transition to PO meds for nausea and pain control  - repeat CMP, LFTs uptrending  -ERCP tomorrow, 11/11  -GI following    Leukocytosis  Assessment & Plan  No indication of infection at this time. No elevated WBC 11/8/2021          Constipation  Assessment & Plan  Increased diarrhea yesterday from 1500 to 2200.     Plan:  -Monitor  bowel movements, evaluate as needed  -Bowel regimen  -Decrease Opioid medications    Nausea  Assessment & Plan  Increased nausea associated with increased pain over the past twenty four hours. Denies any current nausea on evaluation.   Nausea maybe associated with choledocholithiasis, and/or opiate use.     Plan:  -Decrease IV pain medication use  - Zofran oral and IV available, PO first line and IV if unable to tolerate  - Phenergan PRN for breakthrough    Hypokalemia  Assessment & Plan  Replenished 40 mEq, BID, four hours apart.

## 2021-11-11 NOTE — CARE PLAN
The patient is Stable - Low risk of patient condition declining or worsening    Shift Goals  Clinical Goals: Tolerate clear liquid diet  Patient Goals: Pain control, sleep    Progress made toward(s) clinical / shift goals: See note below regarding pain control, sleep, and diet tolerance    Problem: Pain - Standard  Goal: Alleviation of pain or a reduction in pain to the patient’s comfort goal  Outcome: Progressing  Note: Patient denies the need for pain meds at this time, reports her pain being under control. Has been able to sleep so far this shift     Problem: Gastrointestinal Irritability  Goal: Nausea and vomiting will be absent or improve  Outcome: Progressing  Note: Patient has not had any nausea today       COVID-19 surge in effect

## 2021-11-11 NOTE — PROGRESS NOTES
Patient seen and examined before proceeding with ERCP sphincterotomy common bile duct stones extraction with fluoroscopy under anesthesia and possible temporary stent, biopsies and/or other endotherapy.    Risks, benefits, and alternatives of aforementioned procedures were discussed with patient and family members (mother and brother) in detail before proceeding.  Consenting persons were given opportunities to ask questions and discuss other options.  Risks including but not limited to pancreatitis, contrast reaction, radiation exposure, stent if inserted patient responsibility to help schedule within 3 months or less, retained and/or recurrent choledocholithiasis, perforation, infection, bleeding, missed lesion(s), possible need for surgery(ies) and/or interventional radiology, possible need for repeat procedure(s) and/or additional testing, hospitalization possibly prolonged, cardiac and/or pulmonary event, aspiration, hypoxia, stroke, medication (s) and/or anesthesia reaction(s), indefinite diagnosis, discomfort/pain, unsuccessful and/or incomplete procedure, ineffective therapy, persistent symptoms, damage to adjacent organs/structure and/or vascular, and other adverse event(s) possibly life-threatening.  Interactive discussion was undertaken with Layman's terms.  I answered questions in full and to satisfaction.  I gave opportunity to cancel, delay and/or reschedule if not completely comfortable with proceeding.  Consenting persons stated understanding and acceptance of these risks, and wished to proceed.   Informed consent was given in clear state of mind and paper permit was confirmed to have been signed before proceeding.

## 2021-11-11 NOTE — ANESTHESIA POSTPROCEDURE EVALUATION
Patient: Karina Stern    Procedure Summary     Date: 11/11/21 Room / Location: [facility] ROOM 26 / SURGERY SAME DAY [facility]    Anesthesia Start: 0942 Anesthesia Stop: 1008    Procedures:       ERCP (ENDOSCOPIC RETROGRADE CHOLANGIOPANCREATOGRAPHY) (N/A Esophagus)      ERCP,WITH CALCULUS REMOVAL FROM BILE OR PANCREATIC DUCT (Esophagus) Diagnosis: (CHOLEDOCHOLITHIASIS WITH SUCCESSFUL ERCP)    Surgeons: Jairon Trevino M.D. Responsible Provider: Kita Moe M.D.    Anesthesia Type: general ASA Status: 2          Final Anesthesia Type: general  Last vitals  BP   Blood Pressure: 114/60    Temp   36.4 °C (97.5 °F)    Pulse   94   Resp   14    SpO2   94 %      Anesthesia Post Evaluation    Patient location during evaluation: PACU  Patient participation: complete - patient participated  Level of consciousness: awake and alert  Pain score: 0    Airway patency: patent  Anesthetic complications: no  Cardiovascular status: adequate and hemodynamically stable  Respiratory status: acceptable  Hydration status: acceptable    Anesthesia PONV: receiving meds.          No complications documented.     Nurse Pain Score: 0 (NPRS)

## 2021-11-11 NOTE — OR NURSING
1006: Pt arrived from OR to [facility] #3. ID verified. Report received. Connected to monitor. 6L O2 via mask. Pt awake and alert, c/o nausea. Has facial redness. noted pt's lower lip slightly swollen, no bleeding.     1009: PRN zofran given for nausea    1015: weaned off O2 to room air    1022: Dr Trevino at bedside to speak with patient and gave telephone updates to mom Sophia.    1024: PRN benadryl given for unrelieved nausea    1050: telephone report to [employer] [facility]. Pt to return to R324. Per pt nausea is better, comfortable returning to room.    1054: Pt off the floor via ezequiel with [employer] [facility]. On room air. No monitor required. No belongings, chart on [place]wn[place]. Mom and family accompanying pt upstairs.

## 2021-11-11 NOTE — OR NURSING
Voalte message to Dr. Trevino to update on need for APRN note to be cosigned and update to procedural consent order.

## 2021-11-11 NOTE — ANESTHESIA TIME REPORT
Anesthesia Start and Stop Event Times     Date Time Event    11/11/2021 0942 Anesthesia Start     1008 Anesthesia Stop        Responsible Staff  11/11/21    Name Role Begin End    Kita Moe M.D. Anesth 0942 1008        Preop Diagnosis (Free Text):  Pre-op Diagnosis     Common bile duct dilatation with choledocholithiasis        Preop Diagnosis (Codes):    Premium Reason  G. Contracted, Vacation    Comments:

## 2021-11-11 NOTE — ANESTHESIA PREPROCEDURE EVALUATION
Case: [id number] Date/Time: 11/11/21 0915    Procedure: ERCP (ENDOSCOPIC RETROGRADE CHOLANGIOPANCREATOGRAPHY) (N/A Esophagus)    Anesthesia type: General    Pre-op diagnosis: Common bile duct dilatation with choledocholithiasis    Location: [facility] ROOM 26 / SURGERY SAME DAY [facility]    Surgeons: Jairon Trevino M.D.        36yoF   Post lap brooke  Hx of Raynauds  Hx of MVP    Allergies to dilaudid, cantaloupe    NPO  No AC    Relevant Problems   No relevant active problems       Physical Exam    Airway   Mallampati: II       Cardiovascular - normal exam     Dental - normal exam           Pulmonary - normal exam     Abdominal - normal exam     Neurological - normal exam                 Anesthesia Plan    ASA 2       Plan - general       Airway plan will be ETT          Induction: intravenous    Postoperative Plan: Postoperative administration of opioids is intended.    Pertinent diagnostic labs and testing reviewed    Informed Consent:    Anesthetic plan and risks discussed with patient.

## 2021-11-12 VITALS
OXYGEN SATURATION: 95 % | HEIGHT: 67 IN | RESPIRATION RATE: 16 BRPM | SYSTOLIC BLOOD PRESSURE: 101 MMHG | DIASTOLIC BLOOD PRESSURE: 57 MMHG | BODY MASS INDEX: 26.23 KG/M2 | TEMPERATURE: 98.2 F | HEART RATE: 57 BPM | WEIGHT: 167.11 LBS

## 2021-11-12 LAB
ALBUMIN SERPL BCP-MCNC: 4.4 G/DL (ref 3.2–4.9)
ALBUMIN/GLOB SERPL: 1.4 G/DL
ALP SERPL-CCNC: 151 U/L (ref 30–99)
ALT SERPL-CCNC: 123 U/L (ref 2–50)
ANION GAP SERPL CALC-SCNC: 13 MMOL/L (ref 7–16)
AST SERPL-CCNC: 43 U/L (ref 12–45)
BILIRUB SERPL-MCNC: 0.7 MG/DL (ref 0.1–1.5)
BUN SERPL-MCNC: 5 MG/DL (ref 8–22)
CALCIUM SERPL-MCNC: 9.3 MG/DL (ref 8.5–10.5)
CHLORIDE SERPL-SCNC: 104 MMOL/L (ref 96–112)
CO2 SERPL-SCNC: 20 MMOL/L (ref 20–33)
CREAT SERPL-MCNC: 0.57 MG/DL (ref 0.5–1.4)
GLOBULIN SER CALC-MCNC: 3.2 G/DL (ref 1.9–3.5)
GLUCOSE SERPL-MCNC: 136 MG/DL (ref 65–99)
POTASSIUM SERPL-SCNC: 4.2 MMOL/L (ref 3.6–5.5)
PROT SERPL-MCNC: 7.6 G/DL (ref 6–8.2)
SODIUM SERPL-SCNC: 137 MMOL/L (ref 135–145)

## 2021-11-12 PROCEDURE — 80053 COMPREHEN METABOLIC PANEL: CPT

## 2021-11-12 PROCEDURE — 99239 HOSP IP/OBS DSCHRG MGMT >30: CPT | Mod: GC | Performed by: HOSPITALIST

## 2021-11-12 PROCEDURE — 36415 COLL VENOUS BLD VENIPUNCTURE: CPT

## 2021-11-12 ASSESSMENT — ENCOUNTER SYMPTOMS
PSYCHIATRIC NEGATIVE: 1
RESPIRATORY NEGATIVE: 1
MUSCULOSKELETAL NEGATIVE: 1
ABDOMINAL PAIN: 1
CARDIOVASCULAR NEGATIVE: 1
NAUSEA: 0
EYES NEGATIVE: 1
NEUROLOGICAL NEGATIVE: 1

## 2021-11-12 NOTE — CARE PLAN
Problem: Nutritional:  Goal: Achieve adequate nutritional intake  Description: Patient will tolerate PO diet and consume 50% of meals  Outcome: Progressing     Patient now on a regular diet and tolerating it.  Intake at last two recorded meals was %.

## 2021-11-12 NOTE — PROGRESS NOTES
"Daily Progress Note:     Date of Service: 11/11/2021  Primary Team: [facility] Green Team   Attending: Juan Luis Madrigal M.D.   Senior Resident: Dr. Tyler Floyd  Intern: Dr. Ce Cole  Contact:  [phone]    Chief Complaint:  Abdominal Pain    Patient ID/Summary: Patient is a 36 year old female with a past medical history of cholecystectomy, angioedema of unknown origin, Raynaud phenomenon and mitral valve prolapse. She presented on 11/4/21 after a 1 week history of intermittent right upper quadrant pain that radiated to the back. Imaging indicated cholelithiasis and she was admitted. GI was consulted and ERCP was deferred due to initial improving transmamitinits and clinical symptoms.   She had increased abdominal pain and was not tolerating PO intake as of 11/8. A repeat CT was performed, concern for colonic ileus was found on imaging and as well as a 13 mm dilation of the common bile duct.   ERCP performed today, patient is advancing diet as tolerated.       Subjective: No acute events overnight. This morning patient stated that she felt tired and felt that her stomach was \"turning over.\" She denies any pain, chills or nausea and vomiting.     Interval History: Patient is feeling well after ERCP. She noted having nausea after procedure and is now tolerating full liquids.       Consultants/Specialty:  Gastroenterology      Review of Systems:    Review of Systems   Constitutional: Negative for chills, diaphoresis, fever, malaise/fatigue and weight loss.   HENT: Negative.  Negative for congestion, ear pain, hearing loss, nosebleeds and tinnitus.    Eyes: Negative.    Respiratory: Negative.  Negative for cough and shortness of breath.    Cardiovascular: Negative for chest pain, palpitations and leg swelling.   Gastrointestinal: Negative for abdominal pain, blood in stool, constipation, diarrhea, heartburn, nausea and vomiting.        Patient feels that stomach turning over.    Genitourinary: Negative.  "   Musculoskeletal: Negative.    Skin: Negative for itching and rash.   Neurological: Negative.    Endo/Heme/Allergies: Negative.    Psychiatric/Behavioral: Negative.        Objective Data:      Vitals:   Temp:  [36.1 °C (97 °F)-36.8 °C (98.2 °F)] 36.4 °C (97.6 °F)  Pulse:  [62-94] 63  Resp:  [14-18] 16  BP: ()/(48-74) 114/60  SpO2:  [94 %-99 %] 95 %      Physical Exam  Constitutional:       General: She is not in acute distress.     Appearance: Normal appearance. She is not ill-appearing, toxic-appearing or diaphoretic.      Comments: Patient is sitting in bed, looks tired.    HENT:      Head: Normocephalic and atraumatic.      Nose: Nose normal.      Mouth/Throat:      Mouth: Mucous membranes are moist.      Pharynx: Oropharynx is clear. No oropharyngeal exudate or posterior oropharyngeal erythema.   Eyes:      General: No scleral icterus.        Right eye: No discharge.         Left eye: No discharge.      Extraocular Movements: Extraocular movements intact.      Conjunctiva/sclera: Conjunctivae normal.   Cardiovascular:      Rate and Rhythm: Normal rate and regular rhythm.      Pulses: Normal pulses.      Heart sounds: Normal heart sounds. No murmur heard.  No friction rub. No gallop.    Pulmonary:      Effort: Pulmonary effort is normal. No respiratory distress.      Breath sounds: Normal breath sounds. No stridor. No wheezing, rhonchi or rales.   Chest:      Chest wall: No tenderness.   Abdominal:      General: Abdomen is flat. A surgical scar is present. Bowel sounds are decreased. There is no distension.      Tenderness: There is no abdominal tenderness. There is no guarding or rebound.      Hernia: No hernia is present.      Comments: Patient reports feeling pressure with palpation, in upper right and left quadrant.     Musculoskeletal:      Cervical back: Normal range of motion. No rigidity or tenderness.   Neurological:      Mental Status: She is alert.           Labs:   Recent Labs      11/08/21  1740 11/10/21  0032 11/11/21  0115   SODIUM 137 134* 137   POTASSIUM 3.4* 4.2 4.0   CHLORIDE 100 102 103   CO2 23 21 21   GLUCOSE 94 81 85   BUN 5* 4* 4*     Recent Labs     11/08/21  1740 11/10/21  0032 11/11/21  0115   ALBUMIN 4.5 4.2  --    TBILIRUBIN 2.4* 1.3  --    ALKPHOSPHAT 211* 196*  --    TOTPROTEIN 7.7 7.0  --    ALTSGPT 352* 238*  --    ASTSGOT 455* 209*  --    CREATININE 0.73 0.61 0.53     Recent Labs     11/08/21 1740   WBC 6.5   RBC 4.47   HEMOGLOBIN 13.0   HEMATOCRIT 40.8   MCV 91.3   MCH 29.1   RDW 41.8   PLATELETCT 268   MPV 12.1   NEUTSPOLYS 74.70*   LYMPHOCYTES 16.50*   MONOCYTES 6.30   EOSINOPHILS 1.70   BASOPHILS 0.50         Imaging:     CT 11/8/2021: Dilated CBD and intrahepatic bile ducts, worsening. CBD 13 mm.   Air fluid levels in colon, concern for colonic ileus or diarrheal disease    ERCP 11/11/2021: Technically sucessful ERCP, biliary stone removal, and sphincterotomy. Images show several small ovoid intraluminal filling defects in the distal common bile duct likely representing air bubbles.     Problem Representation:     * Choledocholithiasis  Assessment & Plan  Assessment:   Acute abdominal pain, history of cholecystectomy in 2011 for cholelithiasis. CT abd revealed L intrahepatic duct dilation, concerning for stricture. Liver enzymes consistent with acute intrahepatic etiology. Has had multiple episodes of emesis (x6, nonbloody). Very unlikely to be obstruction due to cholelithiasis. MRCP consistent with cholelithiasis.   GI consulted and due to improved abdominal pain and downtrending AST (18 from 210) and ALT (38 from 110). Current plan defer ERCP as stone seems to have passed and will need follow up outpatient.  11/8: CT showed evidence of dilated CBD, re-consulted GI. Elevated LFTs, indicating inflammatory process, and/or obstruction.   Continues to have bloating sensation, pressure with palpation.   11/11: ERCP was performed, bile duct stone was removed. Images  taken.      Plan:  - Full liquid diet advance, NPO midnight  - Attempt to transition to PO meds for nausea and pain control  - repeat CMP, LFTs uptrending  -ERCP tomorrow, 11/11  -GI following    Leukocytosis  Assessment & Plan  No indication of infection at this time. No elevated WBC 11/8/2021          Constipation  Assessment & Plan  Resolved, will monitor post procedure.     Plan:  -Monitor bowel movements, evaluate as needed  -Bowel regimen  -Decrease Opioid medications    Nausea  Assessment & Plan  Increased nausea associated with increased pain over the past twenty four hours. Denies any current nausea on evaluation.   Nausea maybe associated with choledocholithiasis, and/or opiate use.     Plan:  -Decrease IV pain medication use  - Zofran oral and IV available, PO first line and IV if unable to tolerate  - Phenergan PRN for breakthrough    Hypokalemia  Assessment & Plan  Replenish as needed.

## 2021-11-12 NOTE — CARE PLAN
The patient is Stable - Low risk of patient condition declining or worsening    Shift Goals  Clinical Goals: Tolerate diet  Patient Goals: Sleep comfortably    Progress made toward(s) clinical / shift goals: Patient has been able to tolerate her full liquid diet so far today, hasn't had any nausea. Has been able to sleep a couple hours so far this shift    Problem: Pain - Standard  Goal: Alleviation of pain or a reduction in pain to the patient’s comfort goal  Outcome: Progressing  Note: Patient denies the need for pain meds at this time, rates her pain at a 3 out of 10     Problem: Gastrointestinal Irritability  Goal: Nausea and vomiting will be absent or improve  Outcome: Progressing       Patient is not progressing towards the following goals:      Problem: Gastrointestinal Irritability  Goal: Diarrhea will be absent or improved  Outcome: Not Progressing  Note: Patient isn't having full-blown diarrhea, but continues to have a couple loose stools a day      COVID-19 surge in effect

## 2021-11-12 NOTE — DISCHARGE PLANNING
Anticipated Discharge Disposition: Home to her prior living arrangement, no needs from case management.    Action: This RN,  reviewed patient's chart, Insurance CM left a voice message, P: [phone] asking if CM needed any assistance in discharge planning, none was anticipated.     Barriers to Discharge: None    Plan: Patient is discharging home today.     Abbey Billings RN,

## 2021-11-12 NOTE — DISCHARGE SUMMARY
Discharge Summary    Date of Admission: 11/4/2021  Date of Discharge: 11/12/2021  Discharging Attending: Juan Luis Madrigal M.D.   Discharging Senior Resident: Dr. Floyd  Discharging Intern: Dr. Cole    CHIEF COMPLAINT ON ADMISSION  Chief Complaint   Patient presents with   • Abdominal Pain   • Nausea/Vomiting/Diarrhea       Reason for Admission  Choledocholithiasis    Admission Date  11/4/2021    CODE STATUS  Full Code    HPI & HOSPITAL COURSE  This is a 36 y.o. female PMHx +DICK and raynaud's, MVP, and prior cholecystectomy 2007 during pregnancy here with postprandial abdominal pain 11/4/21 found to have choledocholithiasis.  Patient had previously been at ER and diagnosed with gastritis but symptoms persisted despite dietary changes, she was found to have elevated liver enzymes. CT abd/pelvis showed intrahepatic biliary dilatation which was followed by MRCP showing beaded appearance of intrahepatic ducts, CBD with choledocholithiasis.     Choledocholithiasis  However, patient's enzymes and symptoms appeared to improve and ERCP was waived for GI followup. However, as patient's diet was escalated she continued to have symptoms, repeat CT showed choledocholithiasis and elevated enzymes again. Patient underwent ERCP 11/11 with resolution, requiring partial sphincterotomy but did not confirm findings of possible strictures prior that were concerning for primary sclerosing cholangitis. Patient had + DICK 1:320 still concerning for possible autoimmune correlative disease which was not fully confirmed by time of discharge, she will follow up with GI.       Therefore, she is discharged in fair and stable condition to home with close outpatient follow-up.    The patient met 2-midnight criteria for an inpatient stay at the time of discharge.    PHYSICAL EXAM ON DISCHARGE  Temp:  [36.3 °C (97.4 °F)-36.8 °C (98.2 °F)] 36.3 °C (97.4 °F)  Pulse:  [63-94] 64  Resp:  [14-18] 14  BP: ()/(54-74) 94/54  SpO2:  [91 %-99 %] 91  %    Physical Exam  Constitutional:       General: She is not in acute distress.     Appearance: Normal appearance. She is not ill-appearing, toxic-appearing or diaphoretic.      Comments: Patient is sitting in bed, looks tired.    HENT:      Head: Normocephalic and atraumatic.      Nose: Nose normal.      Mouth/Throat:      Mouth: Mucous membranes are moist.      Pharynx: Oropharynx is clear. No oropharyngeal exudate or posterior oropharyngeal erythema.   Eyes:      General: No scleral icterus.        Right eye: No discharge.         Left eye: No discharge.      Extraocular Movements: Extraocular movements intact.      Conjunctiva/sclera: Conjunctivae normal.   Cardiovascular:      Rate and Rhythm: Normal rate and regular rhythm.      Pulses: Normal pulses.      Heart sounds: Normal heart sounds. No murmur heard.  No friction rub. No gallop.    Pulmonary:      Effort: Pulmonary effort is normal. No respiratory distress.      Breath sounds: Normal breath sounds. No stridor. No wheezing, rhonchi or rales.   Chest:      Chest wall: No tenderness.   Abdominal:      General: Abdomen is flat. A surgical scar is present. Bowel sounds are decreased. There is no distension.      Tenderness: There is no abdominal tenderness. There is no guarding or rebound.      Hernia: No hernia is present.      Comments: Patient reports feeling pressure with palpation, in upper right and left quadrant.     Musculoskeletal:      Cervical back: Normal range of motion. No rigidity or tenderness.   Neurological:      Mental Status: She is alert.         Discharge Date  11/12/2021    FOLLOW UP ITEMS POST DISCHARGE  No NSAID 10 days  GI followup  Slow advance diet, low fat  F/U anti smooth muscle    DISCHARGE DIAGNOSES  Principal Problem:    Choledocholithiasis POA: Unknown  Active Problems:    Raynaud phenomenon POA: Unknown      Overview: Diagnosed by rheumatologist, Dr. Mills a couple months ago.    Hypokalemia POA: Unknown    Leukocytosis  POA: Unknown    Nausea POA: Unknown    Constipation POA: Unknown  Resolved Problems:    * No resolved hospital problems. *      FOLLOW UP  No follow-up provider specified.   Dr. Lopez Carreon to call for followup in 1 month    MEDICATIONS ON DISCHARGE     Medication List      STOP taking these medications    acetaminophen 500 MG Tabs  Commonly known as: TYLENOL     famotidine 20 MG Tabs  Commonly known as: PEPCID     mag hydrox-al hydrox-simeth 400-400-40 MG/5ML Susp  Commonly known as: MAALOX PLUS ES or MYLANTA DS            Allergies  Allergies   Allergen Reactions   • Cantaloupe      Itchy throat   • Dilaudid [Hydromorphone Hcl]      Patient doesn't specifically recall event, thinks she possibly stopped breathing. Has tolerated oxycodone, hydrocodone, and morphine historically without issues.        DIET  Orders Placed This Encounter   Procedures   • Diet Order Diet: Full Liquid     Standing Status:   Standing     Number of Occurrences:   1     Order Specific Question:   Diet:     Answer:   Full Liquid [11]   • Diet Order Diet: Regular (low-fat)     Standing Status:   Standing     Number of Occurrences:   1     Order Specific Question:   Diet:     Answer:   Regular [1]     Comments:   low-fat       ACTIVITY  As tolerated.  Weight bearing as tolerated    CONSULTATIONS  Dr. Dr. Carroen with Gastroenterology consulted.  Treatment options were discussed and plan of care agreed upon.    PROCEDURES  ERCP 11/11 Dr. Trevino    Endoscopic Retrograde CholangioPancreatography Findings:  - Ampulla of Vater: located in 2nd portion of duodenum, partial prior biliary sphincterotomy noted with papillary stenosis.  - Cholangiogram: choledocholithiasis with small filling defects but no (other) stricture(s).  Intrahepatic duct beading was noted that is nonspecific and could have been from common bile duct stones.  Cystic duct stump with surgical clips consistent with prior cholecystectomy.  - Pancreatogram: intentionally not injected nor  cannulated.  - Therapy: biliary extension completion sphincterotomy performed for papillary stenosis with subsequent balloon stones extraction, extraction completed.

## 2021-11-12 NOTE — DISCHARGE INSTRUCTIONS
"You were admitted to the hospital for choledocholithiasis, a stone in your bile duct. Please continue to take the tylenol and oxycodone for pain only as needed, as well as the miralax for continued bowel movements (target once a day). Additionally, it is important to follow up with the GI doctors in 2-4 weeks at [facility] with Dr. Orta (please schedule this appointment soon), as well as the autoimmune laboratory work up that was started in the hospital.     Please monitor for any fever, chills, or confusion that develops and seek medical attention if this develops. Thank you for allowing us to participate in your care and we wish you the best of health in the future!    Abdominal Pain, Adult    Many things can cause belly (abdominal) pain. Most times, belly pain is not dangerous. Many cases of belly pain can be watched and treated at home. Sometimes belly pain is serious, though. Your doctor will try to find the cause of your belly pain.  Follow these instructions at home:  · Take over-the-counter and prescription medicines only as told by your doctor. Do not take medicines that help you poop (laxatives) unless told to by your doctor.  · Drink enough fluid to keep your pee (urine) clear or pale yellow.  · Watch your belly pain for any changes.  · Keep all follow-up visits as told by your doctor. This is important.  Contact a doctor if:  · Your belly pain changes or gets worse.  · You are not hungry, or you lose weight without trying.  · You are having trouble pooping (constipated) or have watery poop (diarrhea) for more than 2-3 days.  · You have pain when you pee or poop.  · Your belly pain wakes you up at night.  · Your pain gets worse with meals, after eating, or with certain foods.  · You are throwing up and cannot keep anything down.  · You have a fever.  Get help right away if:  · Your pain does not go away as soon as your doctor says it should.  · You cannot stop throwing up.  · Your " pain is only in areas of your belly, such as the right side or the left lower part of the belly.  · You have bloody or black poop, or poop that looks like tar.  · You have very bad pain, cramping, or bloating in your belly.  · You have signs of not having enough fluid or water in your body (dehydration), such as:  ? Dark pee, very little pee, or no pee.  ? Cracked lips.  ? Dry mouth.  ? Sunken eyes.  ? Sleepiness.  ? Weakness.  This information is not intended to replace advice given to you by your health care provider. Make sure you discuss any questions you have with your health care provider.  Document Released: 06/05/2009 Document Revised: 07/07/2017 Document Reviewed: 05/31/2017  [employer] Interactive Patient Education © 2020 [employer]      Discharge Instructions    Discharged to home by car with relative. Discharged via wheelchair, hospital escort: Yes.  Special equipment needed: Not Applicable    Be sure to schedule a follow-up appointment with your primary care doctor or any specialists as instructed.     Discharge Plan:   Influenza Vaccine Indication: Patient Refuses    I understand that a diet low in cholesterol, fat, and sodium is recommended for good health. Unless I have been given specific instructions below for another diet, I accept this instruction as my diet prescription.   Other diet: Regular/Low Fat    Special Instructions: None    · Is patient discharged on Warfarin / Coumadin?   No     Depression / Suicide Risk    As you are discharged from this [facility] facility, it is important to learn how to keep safe from harming yourself.    Recognize the warning signs:  · Abrupt changes in personality, positive or negative- including increase in energy   · Giving away possessions  · Change in eating patterns- significant weight changes-  positive or negative  · Change in sleeping patterns- unable to sleep or sleeping all the time   · Unwillingness or inability to  communicate  · Depression  · Unusual sadness, discouragement and loneliness  · Talk of wanting to die  · Neglect of personal appearance   · Rebelliousness- reckless behavior  · Withdrawal from people/activities they love  · Confusion- inability to concentrate     If you or a loved one observes any of these behaviors or has concerns about self-harm, here's what you can do:  · Talk about it- your feelings and reasons for harming yourself  · Remove any means that you might use to hurt yourself (examples: pills, rope, extension cords, firearm)  · Get professional help from the community (Mental Health, Substance Abuse, psychological counseling)  · Do not be alone:Call your Safe Contact- someone whom you trust who will be there for you.  · Call your local CRISIS HOTLINE 790-0847 or 372-026-1987  · Call your local Children's Mobile Crisis Response Team [place] (012) 805-7213 or www.[employer]  · Call the toll free National Suicide Prevention Hotlines   · National Suicide Prevention Lifeline 560-330-JLIP (5882)  · National Hope Line Network 800-SUICIDE (019-9079)

## 2021-11-17 ENCOUNTER — TELEMEDICINE (OUTPATIENT)
Dept: MEDICAL GROUP | Facility: PHYSICIAN GROUP | Age: 36
End: 2021-11-17
Payer: COMMERCIAL

## 2021-11-17 VITALS — WEIGHT: 165 LBS | BODY MASS INDEX: 25.9 KG/M2 | HEIGHT: 67 IN

## 2021-11-17 DIAGNOSIS — K80.50 CHOLEDOCHOLITHIASIS: ICD-10-CM

## 2021-11-17 PROCEDURE — 99213 OFFICE O/P EST LOW 20 MIN: CPT | Mod: 95 | Performed by: NURSE PRACTITIONER

## 2021-11-17 ASSESSMENT — FIBROSIS 4 INDEX: FIB4 SCORE: 0.52

## 2021-11-17 NOTE — PROGRESS NOTES
"Virtual Visit: Established Patient   This visit was conducted via Zoom using secure and encrypted videoconferencing technology.   The patient was in a private location in the state of [place].    The patient's identity was confirmed and verbal consent was obtained for this virtual visit.    Subjective:   CC:   Chief Complaint   Patient presents with   • Follow-Up     ERCP       Karina Stern is a 36 y.o. female presenting for evaluation and management of:    Choledocholithiasis  Very pleasant 36-year-old female patient presents today status post hospitalization, she underwent ERCP for stone retrieval  She does have history of cholecystectomy in 2011 for cholelithiasis  She presented for worsening of abdominal pain, nausea and vomiting  Found to have stricture with a lodged stone in the bile duct  Hospital discharge summary notes reviewed with patient  She was advised to follow-up with gastroenterology, new referral has been placed  She states overall that she is feeling better, continues to have a bit of nausea and has been continuing on a low-fat bland diet  She has questions regarding her diet, advised her to continue on such diet until she can be seen by gastroenterology, however if she continues to feel better with each day she can try advancing slowly and backing down if her symptoms return        ROS: per HPI      Current medicines (including changes today)  No current outpatient medications on file.     No current facility-administered medications for this visit.       Patient Active Problem List    Diagnosis Date Noted   • Constipation 11/08/2021   • Nausea 11/05/2021   • Choledocholithiasis 11/04/2021   • Hypokalemia 11/04/2021   • Leukocytosis 11/04/2021   • Gastritis 11/02/2021   • Pain in both upper extremities 08/13/2018   • Vertigo 08/13/2018   • Tongue lesion 10/03/2017   • Angioedema 08/30/2017   • Raynaud phenomenon 08/30/2017        Objective:   Ht 1.702 m (5' 7\")   Wt 74.8 kg (165 lb)  "  LMP 10/31/2021   BMI 25.84 kg/m²     Physical Exam:  Constitutional: Alert, no distress, well-groomed.  Skin: No rashes in visible areas.  Eye: Round. Conjunctiva clear, lids normal. No icterus.   ENMT: Lips pink without lesions, good dentition, moist mucous membranes. Phonation normal.  Neck: No masses, no thyromegaly. Moves freely without pain.  Respiratory: Unlabored respiratory effort, no cough or audible wheeze  Psych: Alert and oriented x3, normal affect and mood.     Assessment and Plan:   The following treatment plan was discussed:     New referral has been placed to gastroenterology  Advised to continue on bland low-fat diet, only advance as tolerated    1. Choledocholithiasis  - Referral to Gastroenterology      Follow-up: Return As needed, pending GI consultation.

## 2021-11-17 NOTE — ASSESSMENT & PLAN NOTE
Very pleasant 36-year-old female patient presents today status post hospitalization, she underwent ERCP for stone retrieval  She does have history of cholecystectomy in 2011 for cholelithiasis  She presented for worsening of abdominal pain, nausea and vomiting  Found to have stricture with a lodged stone in the bile duct  Hospital discharge summary notes reviewed with patient  She was advised to follow-up with gastroenterology, new referral has been placed  She states overall that she is feeling better, continues to have a bit of nausea and has been continuing on a low-fat bland diet  She has questions regarding her diet, advised her to continue on such diet until she can be seen by gastroenterology, however if she continues to feel better with each day she can try advancing slowly and backing down if her symptoms return

## 2023-07-27 NOTE — PROCEDURES
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    Premedications: ancef 2 grams IV    Consent: Risks, benefits, and alternatives of aforementioned procedures were discussed with patient and family members (mother and brother) in detail before proceeding.  Consenting persons were given opportunities to ask questions and discuss other options.  Risks including but not limited to pancreatitis, contrast reaction, radiation exposure, stent if inserted patient responsibility to help schedule within 3 months or less, retained and/or recurrent choledocholithiasis, perforation, infection, bleeding, missed lesion(s), possible need for surgery(ies) and/or interventional radiology, possible need for repeat procedure(s) and/or additional testing, hospitalization possibly prolonged, cardiac and/or pulmonary event, aspiration, hypoxia, stroke, medication (s) and/or anesthesia reaction(s), indefinite diagnosis, discomfort/pain, unsuccessful and/or incomplete procedure, ineffective therapy, persistent symptoms, damage to adjacent organs/structure and/or vascular, and other adverse event(s) possibly life-threatening.  Interactive discussion was undertaken with Layman's terms.  I answered questions in full and to satisfaction.  I gave opportunity to cancel, delay and/or reschedule  "if not completely comfortable with proceeding.  Consenting persons stated understanding and acceptance of these risks, and wished to proceed.   Informed consent was given in clear state of mind and paper permit was confirmed to have been signed before proceeding.    Endoscopic procedures in detail: Olympus side-viewing ERCP flexible duodenoscope was inserted from mouth to 2nd portion of the duodenum.  Biliary duct was selectively cannulated on the first attempt, successful free cannulation was achieved.  Cholangiogram was injected and completed.  Biliary completion extension sphincterotomy was performed with a bow papillotome with subsequent balloon common bile duct stones extraction, clearance was complete without evidence of any residual common bile duct stones on completion cholangiogram. The C-arm was moved and rotated on rainbow axis to optimize imaging of intrahepatic biliary systems in different angles to avoid missing lesions/strictures with ductal overlap and/or image angulation. The balloon was inflated within the right and left intrahepatic ducts and dragged through the entire length of the bile duct out the biliary os multiple times in order to minimize risk of retained stones. The ERCP scope was removed from duodenum to also image the common bile duct \"behind\" the ERCP scope. Of note, no radiologist was present to help obtain nor read ERCP images.  I was the sole physician who obtained and performed interpretation of static and dynamic ERCP fluoroscopic x-ray images, no over-read was requested from the radiologist nor was it necessary.  I suctioned insufflated air and stomach fluid contents upon removal.    Procedure times:  - In-room  09:42  - Start 09:50  - Completed 09:59  - Out of room per nursing records    Endoscopic Retrograde CholangioPancreatography Findings:  - Ampulla of Vater: located in 2nd portion of duodenum, partial prior biliary sphincterotomy noted with papillary stenosis.  - " Cholangiogram: choledocholithiasis with small filling defects but no (other) stricture(s).  Intrahepatic duct beading was noted that is nonspecific and could have been from common bile duct stones.  Cystic duct stump with surgical clips consistent with prior cholecystectomy.  - Pancreatogram: intentionally not injected nor cannulated.  - Therapy: biliary extension completion sphincterotomy performed for papillary stenosis with subsequent balloon stones extraction, extraction completed.     Impression:  1. Choledocholithiasis, extracted  2. Papillary stenosis from prior partial sphincterotomy, completion sphincterotomy performed  3. Prior cholecystectomy   4. History of elevated anti-smooth muscle antibody  5. Nonspecific intrahepatic duct beading    Recommendations:   1.  Routine post-endoscopy anesthesia recovery care.  Transfer back to prior hospital room when when recovery criteria are met.  Aspiration and fall precautions x 24 hours.   2.  Avoid blood-thinners, NSAIDs and fish oil for 10 days.  3.  Full liquid diet today, low-fat tomorrow.  4.  Dr. Lopez Carreon and/or Chasidy PEREZ will be back tomorrow to see patient and follow-up on ASMA results.  5.  I spoke to patient, family and recovery nurse about impression, diagnosis and recommendations.  I answered questions in full and to satisfaction            Yes

## 2023-10-28 ENCOUNTER — APPOINTMENT (OUTPATIENT)
Dept: RADIOLOGY | Facility: MEDICAL CENTER | Age: 38
End: 2023-10-28
Attending: EMERGENCY MEDICINE
Payer: COMMERCIAL

## 2023-10-28 ENCOUNTER — HOSPITAL ENCOUNTER (EMERGENCY)
Facility: MEDICAL CENTER | Age: 38
End: 2023-10-28
Attending: EMERGENCY MEDICINE
Payer: COMMERCIAL

## 2023-10-28 VITALS
HEART RATE: 72 BPM | OXYGEN SATURATION: 96 % | HEIGHT: 68 IN | DIASTOLIC BLOOD PRESSURE: 84 MMHG | TEMPERATURE: 97.9 F | WEIGHT: 170.64 LBS | SYSTOLIC BLOOD PRESSURE: 122 MMHG | BODY MASS INDEX: 25.86 KG/M2 | RESPIRATION RATE: 16 BRPM

## 2023-10-28 DIAGNOSIS — R07.9 LEFT-SIDED CHEST PAIN: ICD-10-CM

## 2023-10-28 LAB
ALBUMIN SERPL BCP-MCNC: 4.4 G/DL (ref 3.2–4.9)
ALBUMIN/GLOB SERPL: 1.4 G/DL
ALP SERPL-CCNC: 71 U/L (ref 30–99)
ALT SERPL-CCNC: 8 U/L (ref 2–50)
ANION GAP SERPL CALC-SCNC: 11 MMOL/L (ref 7–16)
AST SERPL-CCNC: 12 U/L (ref 12–45)
BASOPHILS # BLD AUTO: 0.5 % (ref 0–1.8)
BASOPHILS # BLD: 0.05 K/UL (ref 0–0.12)
BILIRUB SERPL-MCNC: 0.4 MG/DL (ref 0.1–1.5)
BUN SERPL-MCNC: 9 MG/DL (ref 8–22)
CALCIUM ALBUM COR SERPL-MCNC: 8.9 MG/DL (ref 8.5–10.5)
CALCIUM SERPL-MCNC: 9.2 MG/DL (ref 8.5–10.5)
CHLORIDE SERPL-SCNC: 105 MMOL/L (ref 96–112)
CO2 SERPL-SCNC: 23 MMOL/L (ref 20–33)
CREAT SERPL-MCNC: 0.77 MG/DL (ref 0.5–1.4)
D DIMER PPP IA.FEU-MCNC: <0.27 UG/ML (FEU) (ref 0–0.5)
EKG IMPRESSION: NORMAL
EOSINOPHIL # BLD AUTO: 0.07 K/UL (ref 0–0.51)
EOSINOPHIL NFR BLD: 0.7 % (ref 0–6.9)
ERYTHROCYTE [DISTWIDTH] IN BLOOD BY AUTOMATED COUNT: 41.7 FL (ref 35.9–50)
GFR SERPLBLD CREATININE-BSD FMLA CKD-EPI: 101 ML/MIN/1.73 M 2
GLOBULIN SER CALC-MCNC: 3.2 G/DL (ref 1.9–3.5)
GLUCOSE SERPL-MCNC: 123 MG/DL (ref 65–99)
HCG SERPL QL: NEGATIVE
HCT VFR BLD AUTO: 41.8 % (ref 37–47)
HGB BLD-MCNC: 13.3 G/DL (ref 12–16)
IMM GRANULOCYTES # BLD AUTO: 0.02 K/UL (ref 0–0.11)
IMM GRANULOCYTES NFR BLD AUTO: 0.2 % (ref 0–0.9)
LIPASE SERPL-CCNC: 22 U/L (ref 11–82)
LYMPHOCYTES # BLD AUTO: 1.93 K/UL (ref 1–4.8)
LYMPHOCYTES NFR BLD: 20.1 % (ref 22–41)
MCH RBC QN AUTO: 28.8 PG (ref 27–33)
MCHC RBC AUTO-ENTMCNC: 31.8 G/DL (ref 32.2–35.5)
MCV RBC AUTO: 90.5 FL (ref 81.4–97.8)
MONOCYTES # BLD AUTO: 0.49 K/UL (ref 0–0.85)
MONOCYTES NFR BLD AUTO: 5.1 % (ref 0–13.4)
NEUTROPHILS # BLD AUTO: 7.06 K/UL (ref 1.82–7.42)
NEUTROPHILS NFR BLD: 73.4 % (ref 44–72)
NRBC # BLD AUTO: 0 K/UL
NRBC BLD-RTO: 0 /100 WBC (ref 0–0.2)
PLATELET # BLD AUTO: 308 K/UL (ref 164–446)
PMV BLD AUTO: 11.4 FL (ref 9–12.9)
POTASSIUM SERPL-SCNC: 3.4 MMOL/L (ref 3.6–5.5)
PROT SERPL-MCNC: 7.6 G/DL (ref 6–8.2)
RBC # BLD AUTO: 4.62 M/UL (ref 4.2–5.4)
SODIUM SERPL-SCNC: 139 MMOL/L (ref 135–145)
TROPONIN T SERPL-MCNC: <6 NG/L (ref 6–19)
WBC # BLD AUTO: 9.6 K/UL (ref 4.8–10.8)

## 2023-10-28 PROCEDURE — 71045 X-RAY EXAM CHEST 1 VIEW: CPT

## 2023-10-28 PROCEDURE — 83690 ASSAY OF LIPASE: CPT

## 2023-10-28 PROCEDURE — 93005 ELECTROCARDIOGRAM TRACING: CPT

## 2023-10-28 PROCEDURE — 99284 EMERGENCY DEPT VISIT MOD MDM: CPT

## 2023-10-28 PROCEDURE — 93005 ELECTROCARDIOGRAM TRACING: CPT | Performed by: EMERGENCY MEDICINE

## 2023-10-28 PROCEDURE — 36415 COLL VENOUS BLD VENIPUNCTURE: CPT

## 2023-10-28 PROCEDURE — 84484 ASSAY OF TROPONIN QUANT: CPT

## 2023-10-28 PROCEDURE — 85379 FIBRIN DEGRADATION QUANT: CPT

## 2023-10-28 PROCEDURE — 80053 COMPREHEN METABOLIC PANEL: CPT

## 2023-10-28 PROCEDURE — 85025 COMPLETE CBC W/AUTO DIFF WBC: CPT

## 2023-10-28 PROCEDURE — 84703 CHORIONIC GONADOTROPIN ASSAY: CPT

## 2023-10-28 ASSESSMENT — FIBROSIS 4 INDEX
FIB4 SCORE: 0.55
FIB4 SCORE: 0.55

## 2023-10-29 NOTE — ED PROVIDER NOTES
ED Provider Note    CHIEF COMPLAINT  Chief Complaint   Patient presents with    Chest Pain     Since Thursday, left sided CP worse with coughing or sneezing.  Denies SOB or N/V        HPI    Primary care provider: No primary care provider on file.   History obtained from: Patient  History limited by: None     Karina Stern is a 38 y.o. female who presents to the ED with  and daughter complaining of left upper chest pain around her clavicle region that started 2 days ago described as sharp and without radiation.  The pain is worse with coughing or sneezing.  She denies injury or trauma.  Patient unsure if she had a fever.  She denies any significant cough.  She reports slight shortness of breath and nausea without vomiting.  No diarrhea/dysuria/rash/edema.  She denies possibility of pregnancy.  No past medical problems except for mitral valve prolapse and no prior cardiac procedures or surgeries.  She reports her great-grandmother and great great grandmother both had heart attacks.  No known history of blood clots.  Patient reports taking ibuprofen for her pain without much improvement.  She declines pain medicine at this time.    REVIEW OF SYSTEMS  Please see HPI for pertinent positives/negatives.  All other systems reviewed and are negative.     PAST MEDICAL HISTORY  Past Medical History:   Diagnosis Date    Gallstones     Mitral valve prolapse     Raynaud's disease     Vertigo         SURGICAL HISTORY  Past Surgical History:   Procedure Laterality Date    AL ERCP,DIAGNOSTIC N/A 11/11/2021    Procedure: ERCP (ENDOSCOPIC RETROGRADE CHOLANGIOPANCREATOGRAPHY);  Surgeon: Jairon Trevino M.D.;  Location: SURGERY SAME DAY [facility];  Service: Gastroenterology    AL ERCP,W/REMOVAL STONE,KAYCE/PANCR DUCTS  11/11/2021    Procedure: ERCP,WITH CALCULUS REMOVAL FROM BILE OR PANCREATIC DUCT;  Surgeon: Jairon Trevino M.D.;  Location: SURGERY SAME DAY [facility];  " Service: Gastroenterology    CHOLECYSTECTOMY  2011    PRIMARY C SECTION  2011    CHOLECYSTECTOMY      ENDOSCOPY,DIAGNOSTIC W/BIOPSY      ERCP      PRIMARY C SECTION          SOCIAL HISTORY  Social History     Tobacco Use    Smoking status: Never    Smokeless tobacco: Never   Vaping Use    Vaping Use: Never used   Substance and Sexual Activity    Alcohol use: No    Drug use: No    Sexual activity: Not on file        FAMILY HISTORY  No family history on file.     CURRENT MEDICATIONS  Home Medications       Reviewed by Molly Mayes R.N. (Registered Nurse) on 10/28/23 at 1942  Med List Status: Not Addressed     Medication Last Dose Status        Patient Rd Taking any Medications                            ALLERGIES  Allergies   Allergen Reactions    Cantaloupe      Itchy throat    Dilaudid [Hydromorphone Hcl]      Patient doesn't specifically recall event, thinks she possibly stopped breathing. Has tolerated oxycodone, hydrocodone, and morphine historically without issues.         PHYSICAL EXAM  VITAL SIGNS: /84   Pulse 72   Temp 36.6 °C (97.9 °F) (Temporal)   Resp 16   Ht 1.727 m (5' 8\")   Wt 77.4 kg (170 lb 10.2 oz)   SpO2 96%   BMI 25.95 kg/m²  @JUANA[[id number]::@     Pulse ox interpretation: 98% I interpret this pulse ox as normal     Cardiac monitor interpretation: Sinus rhythm with heart rate in the 90s as interpreted by me.  The patient presented with chest pain and cardiac monitor was ordered to monitor for dysrhythmia.    Constitutional: Well developed, well nourished, alert in no apparent distress, nontoxic appearance    HENT: No external signs of trauma, normocephalic  Eyes: PERRL, conjunctiva without erythema, no discharge, no icterus    Neck: Soft and supple, trachea midline, no stridor, no tenderness, no LAD, good ROM    Cardiovascular: Regular rate and rhythm, no murmurs/rubs/gallops, strong distal pulses and good perfusion    Thorax & Lungs: No respiratory distress, CTAB    Abdomen: Soft, " nontender, nondistended, no guarding, no rebound, normal BS    Back: No CVAT     Extremities: No cyanosis, no edema, no gross deformity, good ROM, intact distal pulses with brisk cap refill    Skin: Warm, dry, no pallor/cyanosis, no rash noted      Neuro: A/O times 3, no focal deficits noted    Psychiatric: Cooperative, normal mood and affect, normal judgement, appropriate for clinical situation        DIAGNOSTIC STUDIES / PROCEDURES    EKG  12 Lead EKG obtained at 1946 and interpreted by me:   Rate: 84   Rhythm: Sinus rhythm   Ectopy: None  Intervals: Normal   Axis: Normal   QRS: Normal   ST segments: Normal  T Waves: Normal    Clinical Impression: Sinus rhythm without acute ischemic changes or dysrhythmia       LABS  All labs reviewed by me.     Results for orders placed or performed during the hospital encounter of 10/28/23   CBC with Differential   Result Value Ref Range    WBC 9.6 4.8 - 10.8 K/uL    RBC 4.62 4.20 - 5.40 M/uL    Hemoglobin 13.3 12.0 - 16.0 g/dL    Hematocrit 41.8 37.0 - 47.0 %    MCV 90.5 81.4 - 97.8 fL    MCH 28.8 27.0 - 33.0 pg    MCHC 31.8 (L) 32.2 - 35.5 g/dL    RDW 41.7 35.9 - 50.0 fL    Platelet Count 308 164 - 446 K/uL    MPV 11.4 9.0 - 12.9 fL    Neutrophils-Polys 73.40 (H) 44.00 - 72.00 %    Lymphocytes 20.10 (L) 22.00 - 41.00 %    Monocytes 5.10 0.00 - 13.40 %    Eosinophils 0.70 0.00 - 6.90 %    Basophils 0.50 0.00 - 1.80 %    Immature Granulocytes 0.20 0.00 - 0.90 %    Nucleated RBC 0.00 0.00 - 0.20 /100 WBC    Neutrophils (Absolute) 7.06 1.82 - 7.42 K/uL    Lymphs (Absolute) 1.93 1.00 - 4.80 K/uL    Monos (Absolute) 0.49 0.00 - 0.85 K/uL    Eos (Absolute) 0.07 0.00 - 0.51 K/uL    Baso (Absolute) 0.05 0.00 - 0.12 K/uL    Immature Granulocytes (abs) 0.02 0.00 - 0.11 K/uL    NRBC (Absolute) 0.00 K/uL   Complete Metabolic Panel (CMP)   Result Value Ref Range    Sodium 139 135 - 145 mmol/L    Potassium 3.4 (L) 3.6 - 5.5 mmol/L    Chloride 105 96 - 112 mmol/L    Co2 23 20 - 33 mmol/L     Anion Gap 11.0 7.0 - 16.0    Glucose 123 (H) 65 - 99 mg/dL    Bun 9 8 - 22 mg/dL    Creatinine 0.77 0.50 - 1.40 mg/dL    Calcium 9.2 8.5 - 10.5 mg/dL    Correct Calcium 8.9 8.5 - 10.5 mg/dL    AST(SGOT) 12 12 - 45 U/L    ALT(SGPT) 8 2 - 50 U/L    Alkaline Phosphatase 71 30 - 99 U/L    Total Bilirubin 0.4 0.1 - 1.5 mg/dL    Albumin 4.4 3.2 - 4.9 g/dL    Total Protein 7.6 6.0 - 8.2 g/dL    Globulin 3.2 1.9 - 3.5 g/dL    A-G Ratio 1.4 g/dL   Troponins NOW   Result Value Ref Range    Troponin T <6 6 - 19 ng/L   HCG Qual Serum   Result Value Ref Range    Beta-Hcg Qualitative Serum Negative Negative   D-DIMER   Result Value Ref Range    D-Dimer <0.27 0.00 - 0.50 ug/mL (FEU)   ESTIMATED GFR   Result Value Ref Range    GFR (CKD-EPI) 101 >60 mL/min/1.73 m 2   LIPASE   Result Value Ref Range    Lipase 22 11 - 82 U/L   EKG (NOW)   Result Value Ref Range    Report       [facility] Emergency Dept.    Test Date:  2023-10-28  Pt Name:    AYDEE CHURCH            Department: ER  MRN:        [id number]                      Room:  Gender:     Female                       Technician: [id number]  :        1985                   Requested By:ER TRIAGE PROTOCOL  Order #:    [id number]                    Reading MD:    Measurements  Intervals                                Axis  Rate:       84                           P:          63  NV:         133                          QRS:        56  QRSD:       96                           T:          35  QT:         373  QTc:        441    Interpretive Statements  Sinus rhythm  Probable left atrial enlargement  RSR' in V1 or V2, probably normal variant  No previous ECG available for comparison          RADIOLOGY  I have independently interpreted the diagnostic imaging associated with this visit and am waiting the final reading from the radiologist.   My preliminary interpretation is as follows: No acute findings.    DX-CHEST-PORTABLE (1 VIEW)   Final Result      No acute  cardiopulmonary disease.             COURSE & MEDICAL DECISION MAKING  Nursing notes, VS, PMSFHx reviewed in chart.     Review of past medical records shows the patient had telemedicine visit on November 17, 2021 for follow-up after ERCP for choledocholithiasis.      Differential diagnoses considered include but are not limited to: AMI, dissection, PE, pneumothorax, pericardial effusion/tamponade, myocarditis, pericarditis, pneumonia, pleurisy, costochondritis, esophageal spasm, GERD, hiatal hernia, pancreatitis, muscle strain, neuropathy       ED Observation Status? Yes; I am placing the patient in to an observation status due to a diagnostic uncertainty as well as therapeutic intensity. Patient placed in observation status at 8:41 PM, 10/28/2023.     Observation plan is as follows: We will obtain EKG, imaging and laboratory studies and monitor patient in the ED.    Upon Reevaluation, the patient's condition has: Remained stable and will be discharged.    Patient discharged from ED Observation status at 2250 October 28, 2023.      INITIAL ASSESSMENT AND PLAN  Care Narrative: This is a 38-year-old female patient with past medical history including mitral valve prolapse and Raynaud's who presents to the ED complaining of left upper chest pain starting 2 days ago.  Will obtain EKG, imaging and laboratory studies and closely monitor patient in the ED.      Discussion of management with other QHP or appropriate source(s): None     Escalation of care considered, and ultimately not performed: acute inpatient care management, however at this time, the patient is most appropriate for outpatient management.     Barriers to care at this time, including but not limited to: Patient does not have established PCP.     Decision tools and prescription drugs considered including, but not limited to: Pain Medications   .        Pt risk-stratified as low risk for MACE in the next 6 weeks by HEART Score (0-3): 1    HISTORY  Highly  suspicious +2  Moderately suspicious +1  Slightly suspicious 0    EKG  Significant ST depression +2  Nonspecific repolarization disturbance +1  Normal 0    AGE  ? 65 +2  45-65 +1  < 45 0    RISK FACTORS  Hypercholesterolemia, HTN, DM, Cigarette smoking, positive family history, obesity  ? 3 risk factors or history of atherosclerotic disease +2  1-2 risk factors +1  No risk factors known 0    TROPONIN  ? 3× normal limit +2  1-3× normal limit +1  ? normal limit 0      History and physical exam as above.  Work-up for her left upper chest pain was initiated.  EKG without evidence for acute ischemic changes or dysrhythmia.  Chest x-ray without evidence for acute findings.  Laboratory testing unrevealing.  Patient without troponin elevation despite 2 days of chest pain.  This is unlikely to be ACS.  Unlikely to be PE with negative D-dimer.  Clinically, I have low suspicion for other emergent pathology such as dissection, tamponade, myocarditis.  I discussed the findings with patient and her family.  This is an alert and pleasant patient in no acute distress and nontoxic in appearance clinically stable during her ED stay.  She declined pain medicine.  At this time, no clear etiology for her symptoms but also no convincing evidence for emergent pathology.  I discussed with patient possible etiology including musculoskeletal or neuropathic pain.  She was advised on supportive home care, outpatient follow-up and return to ED precautions.  Patient verbalized understanding and agreed with plan of care with no further questions or concerns.      The patient is referred to a primary physician for blood pressure management, diabetic screening, and for all other preventative health concerns.       FINAL IMPRESSION  1. Left-sided chest pain Acute          DISPOSITION  Patient will be discharged home in stable condition.       FOLLOW UP  [facility]  [address]  [facility] [address]  [phone]  Call in 2  days      [facility], Emergency Dept  [address] [facility] [id number] [phone]    If symptoms worsen         OUTPATIENT MEDICATIONS  There are no discharge medications for this patient.         Electronically signed by: Josiah Huff D.O., 10/28/2023 8:41 PM      Portions of this record were made with voice recognition software.  Despite my review, errors may remain.  Please interpret this chart in the appropriate context.

## 2023-10-29 NOTE — ED TRIAGE NOTES
"Chief Complaint   Patient presents with    Chest Pain     Since Thursday, left sided CP worse with coughing or sneezing.  Denies SOB or N/V       37 yo F to triage for above complaint. CP protocol ordered.      Pt placed in lobby. Pt educated on triage process. Pt encouraged to alert staff for any changes.     Patient and staff wearing appropriate PPE    /73   Pulse 94   Temp 36.7 °C (98.1 °F) (Temporal)   Resp 18   Ht 1.702 m (5' 7\")   Wt 77.9 kg (171 lb 11.8 oz)   SpO2 98%   BMI 26.90 kg/m²     "

## 2023-10-30 ENCOUNTER — PATIENT OUTREACH (OUTPATIENT)
Dept: SCHEDULING | Facility: IMAGING CENTER | Age: 38
End: 2023-10-30
Payer: COMMERCIAL

## 2023-10-31 ENCOUNTER — PATIENT OUTREACH (OUTPATIENT)
Dept: SCHEDULING | Facility: IMAGING CENTER | Age: 38
End: 2023-10-31
Payer: COMMERCIAL

## 2024-03-27 NOTE — ED NOTES
Covid spec to lab, MD at bedside.   
Dry Covid recollect sent to lab.   
MRI screen completed by pt to the best of their knowledge.   
Med Rec completed per patient   Allergies reviewed  No ORAL antibiotics in last 30 days        
Pain 7/10 in her upper abdomen tender to palpation, pt took tylenol at 0300 but it did not help with the pain.  Pt states she went to [place] where she was diagnosed with gastritis last thurs, on tues she went to her PCP who told her to follow a bland diet and come to ER if symptoms gets worse  
Patient transported to imaging  
Pt ambulatory to bathroom, recollect urine sent to lab. Pt to imaging.   
Rapid Covid swab sent to lab.  
Report from AUTUMN Hernandez to Aaron briones RN  
Report given to David LEYVA   
Report given, pt aware of POC. Belongings with pt.   
Transport at bedside  
erp at bedside   
Yes...

## 2024-05-08 ENCOUNTER — OFFICE VISIT (OUTPATIENT)
Dept: URGENT CARE | Facility: PHYSICIAN GROUP | Age: 39
End: 2024-05-08
Payer: COMMERCIAL

## 2024-05-08 VITALS
SYSTOLIC BLOOD PRESSURE: 110 MMHG | BODY MASS INDEX: 26.46 KG/M2 | HEART RATE: 64 BPM | OXYGEN SATURATION: 98 % | WEIGHT: 174 LBS | TEMPERATURE: 98.1 F | RESPIRATION RATE: 16 BRPM | DIASTOLIC BLOOD PRESSURE: 70 MMHG

## 2024-05-08 DIAGNOSIS — B96.89 ACUTE BACTERIAL SINUSITIS: ICD-10-CM

## 2024-05-08 DIAGNOSIS — J01.90 ACUTE BACTERIAL SINUSITIS: ICD-10-CM

## 2024-05-08 DIAGNOSIS — Z20.818 STREP THROAT EXPOSURE: ICD-10-CM

## 2024-05-08 PROCEDURE — 3074F SYST BP LT 130 MM HG: CPT | Performed by: NURSE PRACTITIONER

## 2024-05-08 PROCEDURE — 99213 OFFICE O/P EST LOW 20 MIN: CPT | Performed by: NURSE PRACTITIONER

## 2024-05-08 PROCEDURE — 3078F DIAST BP <80 MM HG: CPT | Performed by: NURSE PRACTITIONER

## 2024-05-08 RX ORDER — AMOXICILLIN AND CLAVULANATE POTASSIUM 875; 125 MG/1; MG/1
1 TABLET, FILM COATED ORAL 2 TIMES DAILY
Qty: 20 TABLET | Refills: 0 | Status: SHIPPED | OUTPATIENT
Start: 2024-05-08 | End: 2024-05-18

## 2024-05-08 ASSESSMENT — FIBROSIS 4 INDEX: FIB4 SCORE: 0.54

## 2024-05-08 NOTE — PROGRESS NOTES
Subjective:   Karina Stern is a 39 y.o. female who presents for Sore Throat (Sore throat, sinus pressure in face. Sore throat for over a week, worsening. Chills last night. )    Patient is a 39-year female presents clinic today stating 7 to 8-day history of worsening sinus pain and pressure, intermittent clear runny nose and thick yellow discharge, maxillary sinus pressure that is worsening, chills last night, and sore throat.  She has not had significant cough, nausea, vomiting, rashes, or bodyaches.  Patient is been taking Allegra without any symptom improvement.  Patient does state that her daughter recently had strep throat.    Medications, Allergies, and current problem list reviewed today in Epic.     Objective:     /70   Pulse 64   Temp 36.7 °C (98.1 °F) (Temporal)   Resp 16   Wt 78.9 kg (174 lb)   SpO2 98%     Physical Exam  Vitals reviewed.   Constitutional:       General: She is not in acute distress.     Appearance: Normal appearance. She is ill-appearing. She is not toxic-appearing.   HENT:      Head: Normocephalic.      Right Ear: Tympanic membrane, ear canal and external ear normal.      Left Ear: Tympanic membrane and ear canal normal.      Nose: Mucosal edema, congestion and rhinorrhea present. Rhinorrhea is purulent.      Right Sinus: Maxillary sinus tenderness present. No frontal sinus tenderness.      Left Sinus: Maxillary sinus tenderness present. No frontal sinus tenderness.      Mouth/Throat:      Lips: Pink. No lesions.      Mouth: Mucous membranes are moist.      Pharynx: Oropharynx is clear. Uvula midline. Posterior oropharyngeal erythema present. No pharyngeal swelling, oropharyngeal exudate or uvula swelling.      Tonsils: No tonsillar exudate.   Eyes:      Extraocular Movements: Extraocular movements intact.      Conjunctiva/sclera: Conjunctivae normal.      Pupils: Pupils are equal, round, and reactive to light.   Cardiovascular:      Rate and Rhythm: Normal rate  and regular rhythm.   Pulmonary:      Effort: Pulmonary effort is normal.   Musculoskeletal:         General: Normal range of motion.      Cervical back: Normal range of motion and neck supple.   Lymphadenopathy:      Cervical: Cervical adenopathy present.   Skin:     General: Skin is warm and dry.   Neurological:      Mental Status: She is alert and oriented to person, place, and time.   Psychiatric:         Mood and Affect: Mood normal.         Behavior: Behavior normal.         Thought Content: Thought content normal.         Judgment: Judgment normal.         Assessment/Plan:     Diagnosis and associated orders:     1. Acute bacterial sinusitis  amoxicillin-clavulanate (AUGMENTIN) 875-125 MG Tab      2. Strep throat exposure           Comments/MDM:     Provided patient with information on the etiology & pathogenesis of bacterial sinusitis.  Patient does have strep throat exposure, did discuss and offer strep testing however patient did politely declined as the Augmentin should cover strep well.    Recommend cool mist humidifier at home, use nasal saline wash (i.e. Nedi-Pot), may take OTC decongestant prn, and antibiotics as prescribed.   Tylenol/Motrin prn HA or discomfort.   Return to clinic for fever >4d, no improvement within 48-72h, or for any other questions or concerns.   Patient was involved with shared decision-making throughout the exam today and verbalizes understanding regards to plan of care, discharge instructions, and follow-up         Differential diagnosis, natural history, supportive care, and indications for immediate follow-up discussed.    Advised the patient to follow-up with the primary care physician for recheck, reevaluation, and consideration of further management.    I personally reviewed prior external notes and test results pertinent to today's visit as well as additional imaging and testing completed in clinic today.     Please note that this dictation was created using voice  recognition software. I have made a reasonable attempt to correct obvious errors, but I expect that there are errors of grammar and possibly content that I did not discover before finalizing the note.

## (undated) DEVICE — ELECTRODE DUAL RETURN W/ CORD - (50/PK)

## (undated) DEVICE — BALLOON RETRIEVAL EXTRACTOR PRO RX   9-12MM

## (undated) DEVICE — BITE BLOCK ADULT 60FR (100EA/CA)

## (undated) DEVICE — WATER IRRIGATION STERILE 1000ML (12EA/CA)

## (undated) DEVICE — KIT CUSTOM PROCEDURE SINGLE FOR ENDO  (15/CA)

## (undated) DEVICE — CONTAINER, SPECIMEN, STERILE

## (undated) DEVICE — ELECTRODE 850 FOAM ADHESIVE - HYDROGEL RADIOTRNSPRNT (50/PK)

## (undated) DEVICE — SYRINGE DISP. 12 CC LL - (100/BX)

## (undated) DEVICE — SUCTION INSTRUMENT YANKAUER BULBOUS TIP W/O VENT (50EA/CA)

## (undated) DEVICE — SPHINCTERTOME TRUETOME 44 20MM PRELOAD JAG 035IN

## (undated) DEVICE — TUBE E-T HI-LO CUFF 7.0MM (10EA/PK)

## (undated) DEVICE — KIT ANESTHESIA W/CIRCUIT & 3/LT BAG W/FILTER (20EA/CA)

## (undated) DEVICE — FILM CASSETTE ENDO

## (undated) DEVICE — TUBE CONNECTING SUCTION - CLEAR PLASTIC STERILE 72 IN (50EA/CA)

## (undated) DEVICE — TUBING O2 7FT TIP SMTH BORE - (50/CA)

## (undated) DEVICE — CANNULA W/ SUPPLY TUBING O2 - (50/CA)

## (undated) DEVICE — SENSOR SPO2 ADULT LNCS ADTX (20/BX) ORDER ITEM #19593

## (undated) DEVICE — MANIFOLD NEPTUNE 1 PORT (20/PK)

## (undated) DEVICE — SYRINGE DISP. 50CC LS - (40/BX)

## (undated) DEVICE — SYRINGE 30 ML LS (56/BX)

## (undated) DEVICE — CANISTER SUCTION RIGID RED 1500CC (40EA/CA)

## (undated) DEVICE — MASK ANESTHESIA ADULT  - (100/CA)

## (undated) DEVICE — SET EXTENSION WITH 2 PORTS (48EA/CA) ***PART #2C8610 IS A SUBSTITUTE*****

## (undated) DEVICE — TOWEL STOP TIMEOUT SAFETY FLAG (40EA/CA)

## (undated) DEVICE — HEAD HOLDER JUNIOR/ADULT

## (undated) DEVICE — PROTECTOR ULNA NERVE - (36PR/CA)